# Patient Record
Sex: FEMALE | Race: WHITE | NOT HISPANIC OR LATINO
[De-identification: names, ages, dates, MRNs, and addresses within clinical notes are randomized per-mention and may not be internally consistent; named-entity substitution may affect disease eponyms.]

---

## 2020-10-04 PROBLEM — Z00.00 ENCOUNTER FOR PREVENTIVE HEALTH EXAMINATION: Status: ACTIVE | Noted: 2020-10-04

## 2020-10-05 ENCOUNTER — APPOINTMENT (OUTPATIENT)
Dept: DISASTER EMERGENCY | Facility: CLINIC | Age: 70
End: 2020-10-05

## 2020-10-06 LAB — SARS-COV-2 N GENE NPH QL NAA+PROBE: NOT DETECTED

## 2020-11-09 ENCOUNTER — APPOINTMENT (OUTPATIENT)
Dept: DISASTER EMERGENCY | Facility: CLINIC | Age: 70
End: 2020-11-09

## 2020-11-10 LAB — SARS-COV-2 N GENE NPH QL NAA+PROBE: NOT DETECTED

## 2020-11-11 ENCOUNTER — TRANSCRIPTION ENCOUNTER (OUTPATIENT)
Age: 70
End: 2020-11-11

## 2020-12-07 ENCOUNTER — APPOINTMENT (OUTPATIENT)
Dept: DISASTER EMERGENCY | Facility: CLINIC | Age: 70
End: 2020-12-07

## 2020-12-08 LAB — SARS-COV-2 N GENE NPH QL NAA+PROBE: NOT DETECTED

## 2021-02-08 DIAGNOSIS — U07.1 COVID-19: ICD-10-CM

## 2021-02-08 DIAGNOSIS — R12 HEARTBURN: ICD-10-CM

## 2021-02-08 DIAGNOSIS — Z78.9 OTHER SPECIFIED HEALTH STATUS: ICD-10-CM

## 2021-02-08 DIAGNOSIS — Z83.3 FAMILY HISTORY OF DIABETES MELLITUS: ICD-10-CM

## 2021-02-08 DIAGNOSIS — J98.4 OTHER DISORDERS OF LUNG: ICD-10-CM

## 2021-02-08 DIAGNOSIS — Z82.49 FAMILY HISTORY OF ISCHEMIC HEART DISEASE AND OTHER DISEASES OF THE CIRCULATORY SYSTEM: ICD-10-CM

## 2021-02-08 DIAGNOSIS — Z87.891 PERSONAL HISTORY OF NICOTINE DEPENDENCE: ICD-10-CM

## 2021-02-08 RX ORDER — BIOTIN 1000 MCG
1000 TABLET,CHEWABLE ORAL DAILY
Refills: 0 | Status: ACTIVE | COMMUNITY

## 2021-02-08 RX ORDER — GABAPENTIN 300 MG/1
300 CAPSULE ORAL AT BEDTIME
Refills: 0 | Status: ACTIVE | COMMUNITY

## 2021-02-08 RX ORDER — DULAGLUTIDE 3 MG/.5ML
3 INJECTION, SOLUTION SUBCUTANEOUS
Refills: 0 | Status: ACTIVE | COMMUNITY

## 2021-02-08 RX ORDER — LOSARTAN POTASSIUM 100 MG/1
100 TABLET, FILM COATED ORAL DAILY
Refills: 0 | Status: ACTIVE | COMMUNITY

## 2021-02-08 RX ORDER — ELECTROLYTES/DEXTROSE
180 SOLUTION, ORAL ORAL DAILY
Refills: 0 | Status: ACTIVE | COMMUNITY

## 2021-02-08 RX ORDER — ZINC SULFATE 50(220)MG
50 CAPSULE ORAL DAILY
Refills: 0 | Status: ACTIVE | COMMUNITY

## 2021-02-08 RX ORDER — B-COMPLEX WITH VITAMIN C
CAPSULE ORAL
Refills: 0 | Status: ACTIVE | COMMUNITY

## 2021-02-08 RX ORDER — INSULIN ASPART 100 [IU]/ML
100 INJECTION, SOLUTION INTRAVENOUS; SUBCUTANEOUS
Refills: 0 | Status: ACTIVE | COMMUNITY

## 2021-02-08 RX ORDER — ICOSAPENT ETHYL 1000 MG/1
1 CAPSULE ORAL TWICE DAILY
Refills: 0 | Status: ACTIVE | COMMUNITY

## 2021-02-08 RX ORDER — SODIUM CHLORIDE 5 %
450 OINTMENT (GRAM) OPHTHALMIC (EYE) DAILY
Refills: 0 | Status: ACTIVE | COMMUNITY

## 2021-02-08 RX ORDER — INSULIN DEGLUDEC INJECTION 200 U/ML
200 INJECTION, SOLUTION SUBCUTANEOUS AT BEDTIME
Refills: 0 | Status: ACTIVE | COMMUNITY

## 2021-02-08 RX ORDER — MULTIVIT-MIN/FOLIC/VIT K/LYCOP 400-300MCG
1000 TABLET ORAL DAILY
Refills: 0 | Status: ACTIVE | COMMUNITY

## 2021-02-08 RX ORDER — CINNAMON BARK 500 MG
CAPSULE ORAL
Refills: 0 | Status: ACTIVE | COMMUNITY

## 2021-02-08 RX ORDER — METFORMIN HYDROCHLORIDE 500 MG/1
500 TABLET, COATED ORAL TWICE DAILY
Refills: 0 | Status: ACTIVE | COMMUNITY

## 2021-02-08 RX ORDER — PANTOPRAZOLE 40 MG/1
40 TABLET, DELAYED RELEASE ORAL
Refills: 0 | Status: ACTIVE | COMMUNITY

## 2021-02-08 RX ORDER — LACTOBACILLUS RHAMNOSUS GG 10B CELL
CAPSULE ORAL DAILY
Refills: 0 | Status: ACTIVE | COMMUNITY

## 2021-02-08 RX ORDER — COLD-HOT PACK
125 MCG EACH MISCELLANEOUS DAILY
Refills: 0 | Status: ACTIVE | COMMUNITY

## 2021-02-08 RX ORDER — ROSUVASTATIN CALCIUM 5 MG/1
5 TABLET, FILM COATED ORAL DAILY
Refills: 0 | Status: ACTIVE | COMMUNITY

## 2021-02-09 ENCOUNTER — APPOINTMENT (OUTPATIENT)
Dept: CARDIOLOGY | Facility: CLINIC | Age: 71
End: 2021-02-09
Payer: MEDICARE

## 2021-02-09 ENCOUNTER — NON-APPOINTMENT (OUTPATIENT)
Age: 71
End: 2021-02-09

## 2021-02-09 VITALS
SYSTOLIC BLOOD PRESSURE: 132 MMHG | HEIGHT: 65 IN | BODY MASS INDEX: 31.82 KG/M2 | TEMPERATURE: 97.5 F | OXYGEN SATURATION: 95 % | DIASTOLIC BLOOD PRESSURE: 78 MMHG | WEIGHT: 191 LBS | HEART RATE: 114 BPM

## 2021-02-09 DIAGNOSIS — E11.9 TYPE 2 DIABETES MELLITUS W/OUT COMPLICATIONS: ICD-10-CM

## 2021-02-09 DIAGNOSIS — R06.02 SHORTNESS OF BREATH: ICD-10-CM

## 2021-02-09 PROCEDURE — 93000 ELECTROCARDIOGRAM COMPLETE: CPT

## 2021-02-09 PROCEDURE — 99204 OFFICE O/P NEW MOD 45 MIN: CPT

## 2021-02-09 NOTE — REASON FOR VISIT
[FreeTextEntry1] : Ms. Bergman is a a 70 yr old F with significant family history of cardiac disease and diabetes and personal history of HTN, HLD, DM, former smoking presents in to establish care in the [facility] program.\par She was diagnosed with COVID 12/28/2020. Was discharged [facility] the [facility] on 1/14/2021. Received BAM 12/29/2020 and went home. The next week she couldn't breathe and was admitted [facility]/ She was discharged on 1/14. She was discharged home with Oxygen and Xarelto 10 mg QD x 1 month. \par Echocardiogram performed at [facility] - is a disc - images and report reviewed. All normal\par \par Labs -, HDL 27, , LDL 36

## 2021-02-09 NOTE — PHYSICAL EXAM
[Normal Appearance] : normal appearance [General Appearance - Well Developed] : well developed [Well Groomed] : well groomed [General Appearance - Well Nourished] : well nourished [No Deformities] : no deformities [General Appearance - In No Acute Distress] : no acute distress [Normal Conjunctiva] : the conjunctiva exhibited no abnormalities [Eyelids - No Xanthelasma] : the eyelids demonstrated no xanthelasmas [Normal Oral Mucosa] : normal oral mucosa [No Oral Pallor] : no oral pallor [No Oral Cyanosis] : no oral cyanosis [Normal Jugular Venous A Waves Present] : normal jugular venous A waves present [Normal Jugular Venous V Waves Present] : normal jugular venous V waves present [No Jugular Venous Sanchez A Waves] : no jugular venous sanchez A waves [Normal Rate] : normal [Normal] : normal [Rhythm Regular] : regular [Normal S1] : normal S1 [Normal S2] : normal S2 [Respiration, Rhythm And Depth] : normal respiratory rhythm and effort [Exaggerated Use Of Accessory Muscles For Inspiration] : no accessory muscle use [Auscultation Breath Sounds / Voice Sounds] : lungs were clear to auscultation bilaterally [Abdomen Soft] : soft [Abdomen Tenderness] : non-tender [Abdomen Mass (___ Cm)] : no abdominal mass palpated [Abnormal Walk] : normal gait [Gait - Sufficient For Exercise Testing] : the gait was sufficient for exercise testing [Cyanosis, Localized] : no localized cyanosis [Nail Clubbing] : no clubbing of the fingernails [Petechial Hemorrhages (___cm)] : no petechial hemorrhages [] : no rash [Skin Color & Pigmentation] : normal skin color and pigmentation [No Venous Stasis] : no venous stasis [Skin Lesions] : no skin lesions [No Skin Ulcers] : no skin ulcer [No Xanthoma] : no  xanthoma was observed [Oriented To Time, Place, And Person] : oriented to person, place, and time [Affect] : the affect was normal [Mood] : the mood was normal [No Anxiety] : not feeling anxious

## 2021-02-09 NOTE — DISCUSSION/SUMMARY
[FreeTextEntry1] : Ms. Bergman is a a 70 yr old F with significant family history of cardiac disease and diabetes and personal history of HTN, HLD, DM, former smoking presents in to establish care in the [facility] program.\par She was diagnosed with COVID 12/28/2020. Was discharged [facility] the [facility] on 1/14/2021. Received BAM 12/29/2020 and went home. The next week she couldn't breathe and was admitted [facility]/ She was discharged on 1/14. She was discharged home with Oxygen and Xarelto 10 mg QD x 1 month. \par Echocardiogram performed at [facility] - is a disc - unable to open ( will also obtain official report )\par \par \par 1) HTN- BP stable \par Continue Losartan 100 mg QD \par \par 2) HLD - Continue Statin as prescribed \par Labs -, HDL 27, , LDL 36 \par Advised Mediterranean style diet and repeat lipid panel in 3 months \par  \par 3) DM - Continue oral hypoglycemic agents as prescribed by endocrine\par \par 4) SOB - Currently on home O2 prn \par pulm following \par \par 5) Tachycardia - Likely secondary to COVID sequlae\par if persists will consider BioTel Patch \par  Advised to call if symptoms worsen \par \par F/u 3 months \par and regular f/u with Cardiologist for routine lipid monitoring and management.\par \par

## 2021-02-09 NOTE — PHYSICAL EXAM
[General Appearance - Well Developed] : well developed [Normal Appearance] : normal appearance [Well Groomed] : well groomed [General Appearance - Well Nourished] : well nourished [No Deformities] : no deformities [General Appearance - In No Acute Distress] : no acute distress [Normal Conjunctiva] : the conjunctiva exhibited no abnormalities [Eyelids - No Xanthelasma] : the eyelids demonstrated no xanthelasmas [Normal Oral Mucosa] : normal oral mucosa [No Oral Pallor] : no oral pallor [No Oral Cyanosis] : no oral cyanosis [Normal Jugular Venous A Waves Present] : normal jugular venous A waves present [Normal Jugular Venous V Waves Present] : normal jugular venous V waves present [No Jugular Venous Sanchez A Waves] : no jugular venous sanchez A waves [Normal Rate] : normal [Normal] : normal [Rhythm Regular] : regular [Normal S1] : normal S1 [Normal S2] : normal S2 [Respiration, Rhythm And Depth] : normal respiratory rhythm and effort [Exaggerated Use Of Accessory Muscles For Inspiration] : no accessory muscle use [Auscultation Breath Sounds / Voice Sounds] : lungs were clear to auscultation bilaterally [Abdomen Soft] : soft [Abdomen Tenderness] : non-tender [Abdomen Mass (___ Cm)] : no abdominal mass palpated [Abnormal Walk] : normal gait [Gait - Sufficient For Exercise Testing] : the gait was sufficient for exercise testing [Cyanosis, Localized] : no localized cyanosis [Nail Clubbing] : no clubbing of the fingernails [Petechial Hemorrhages (___cm)] : no petechial hemorrhages [] : no rash [Skin Color & Pigmentation] : normal skin color and pigmentation [No Venous Stasis] : no venous stasis [Skin Lesions] : no skin lesions [No Xanthoma] : no  xanthoma was observed [No Skin Ulcers] : no skin ulcer [Oriented To Time, Place, And Person] : oriented to person, place, and time [Affect] : the affect was normal [Mood] : the mood was normal [No Anxiety] : not feeling anxious

## 2021-02-09 NOTE — DISCUSSION/SUMMARY
[FreeTextEntry1] : Ms. Bergman is a a 70 yr old F with significant family history of cardiac disease and diabetes and personal history of HTN, HLD, DM, former smoking presents in to establish care in the [facility] program.\par She was diagnosed with COVID 12/28/2020. Was discharged from the [facility] on 1/14/2021. Received BAM 12/29/2020 and went home. The next week she couldn't breathe and was admitted [facility]/ She was discharged on 1/14. She was discharged home with Oxygen and Xarelto 10 mg QD x 1 month. \par Echocardiogram performed at [facility] - is a disc - unable to open ( will also obtain official report )\par \par \par 1) HTN- BP stable \par Continue Losartan 100 mg QD \par \par 2) HLD - Continue Statin as prescribed \par Labs -, HDL 27, , LDL 36 \par Advised Mediterranean style diet and repeat lipid panel in 3 months \par  \par 3) DM - Continue oral hypoglycemic agents as prescribed by endocrine\par \par 4) SOB - Currently on home O2 prn \par pulm following \par \par 5) Tachycardia - Likely secondary to COVID sequlae\par if persists will consider BioTel Patch \par  Advised to call if symptoms worsen \par \par F/u 3 months \par and regular f/u with Cardiologist for routine lipid monitoring and management.\par \par

## 2021-02-09 NOTE — REVIEW OF SYSTEMS
Diagnoses and all orders for this visit:    Essential hypertension  -     COMP METABOLIC PANEL (14); Future  -     LIPID PANEL; Future    Lumbar facet arthropathy  -     TSH W REFLEX TO FREE T4; Future  -     CBC, PLATELET; NO DIFFERENTIAL;  Future    Morbi [Negative] : Endocrine

## 2021-02-09 NOTE — REASON FOR VISIT
[FreeTextEntry1] : Ms. Bergman is a a 70 yr old F with significant family history of cardiac disease and diabetes and personal history of HTN, HLD, DM, former smoking presents in to establish care in the [facility] program.\par She was diagnosed with COVID 12/28/2020. Was discharged from the [facility] on 1/14/2021. Received BAM 12/29/2020 and went home. The next week she couldn't breathe and was admitted [facility]/ She was discharged on 1/14. She was discharged home with Oxygen and Xarelto 10 mg QD x 1 month. \par Echocardiogram performed at [facility] - is a disc - images and report reviewed. All normal\par \par Labs -, HDL 27, , LDL 36

## 2021-05-10 ENCOUNTER — NON-APPOINTMENT (OUTPATIENT)
Age: 71
End: 2021-05-10

## 2021-05-10 ENCOUNTER — APPOINTMENT (OUTPATIENT)
Dept: CARDIOLOGY | Facility: CLINIC | Age: 71
End: 2021-05-10
Payer: MEDICARE

## 2021-05-10 VITALS
HEART RATE: 86 BPM | SYSTOLIC BLOOD PRESSURE: 150 MMHG | BODY MASS INDEX: 31.82 KG/M2 | OXYGEN SATURATION: 95 % | TEMPERATURE: 97 F | WEIGHT: 191 LBS | DIASTOLIC BLOOD PRESSURE: 76 MMHG | HEIGHT: 65 IN

## 2021-05-10 DIAGNOSIS — R00.2 PALPITATIONS: ICD-10-CM

## 2021-05-10 DIAGNOSIS — I10 ESSENTIAL (PRIMARY) HYPERTENSION: ICD-10-CM

## 2021-05-10 DIAGNOSIS — E78.5 HYPERLIPIDEMIA, UNSPECIFIED: ICD-10-CM

## 2021-05-10 PROCEDURE — 93000 ELECTROCARDIOGRAM COMPLETE: CPT

## 2021-05-10 PROCEDURE — 99213 OFFICE O/P EST LOW 20 MIN: CPT

## 2021-05-10 NOTE — HISTORY OF PRESENT ILLNESS
[FreeTextEntry1] : Here for followup\par Doing much better\par No longer on O2\par HR has come down dramatically\par #Palpitaitons- resolved- due to COVID, continue to monitor\par #HTN- BP is stable on Losartan, continue to monitor\par #HLD- Continue Crestor

## 2021-05-10 NOTE — DISCUSSION/SUMMARY
[FreeTextEntry1] : 70 year old woman with HTN HLD and COVID in Jan here for followup\par \par #Palpitaitons- resolved- due to COVID, continue to monitor\par #HTN- BP is stable on Losartan, continue to monitor\par #HLD- Continue Crestor\par

## 2021-10-08 DIAGNOSIS — Z01.818 ENCOUNTER FOR OTHER PREPROCEDURAL EXAMINATION: ICD-10-CM

## 2021-10-15 ENCOUNTER — APPOINTMENT (OUTPATIENT)
Dept: DISASTER EMERGENCY | Facility: CLINIC | Age: 71
End: 2021-10-15

## 2021-10-16 LAB — SARS-COV-2 N GENE NPH QL NAA+PROBE: NOT DETECTED

## 2021-10-22 ENCOUNTER — LABORATORY RESULT (OUTPATIENT)
Age: 71
End: 2021-10-22

## 2021-10-22 ENCOUNTER — APPOINTMENT (OUTPATIENT)
Dept: DISASTER EMERGENCY | Facility: CLINIC | Age: 71
End: 2021-10-22

## 2021-10-22 DIAGNOSIS — Z01.818 ENCOUNTER FOR OTHER PREPROCEDURAL EXAMINATION: ICD-10-CM

## 2021-11-11 ENCOUNTER — APPOINTMENT (OUTPATIENT)
Dept: CARDIOLOGY | Facility: CLINIC | Age: 71
End: 2021-11-11

## 2022-06-02 ENCOUNTER — APPOINTMENT (OUTPATIENT)
Dept: ORTHOPEDIC SURGERY | Facility: CLINIC | Age: 72
End: 2022-06-02
Payer: MEDICARE

## 2022-06-02 VITALS — WEIGHT: 191 LBS | HEIGHT: 65 IN | BODY MASS INDEX: 31.82 KG/M2

## 2022-06-02 PROCEDURE — 73562 X-RAY EXAM OF KNEE 3: CPT | Mod: LT

## 2022-06-02 PROCEDURE — 73502 X-RAY EXAM HIP UNI 2-3 VIEWS: CPT | Mod: LT

## 2022-06-02 PROCEDURE — 99204 OFFICE O/P NEW MOD 45 MIN: CPT | Mod: 25

## 2022-06-02 PROCEDURE — 20610 DRAIN/INJ JOINT/BURSA W/O US: CPT

## 2022-06-02 PROCEDURE — 99214 OFFICE O/P EST MOD 30 MIN: CPT | Mod: 25

## 2022-06-02 NOTE — HISTORY OF PRESENT ILLNESS
[Constant] : constant [Sleep] : sleep [Sitting] : sitting [Standing] : standing [de-identified] : Had LESIs last year for left lumbar radic, now having pain in left hip and left knee, has difficulty walking on certain days uses a cane.  [] : no [FreeTextEntry1] : left hip/knee  [FreeTextEntry5] : patient has been feeling pain in her left hip and knee. She feels a constant pain in the hip that radiates into the leg, down into the foot. Her ankle swells at the end of the day

## 2022-06-02 NOTE — PROCEDURE
[Large Joint Injection] : Large joint injection [Left] : of the left [Knee] : knee [Pain] : pain [Alcohol] : alcohol [Betadine] : betadine [Ethyl Chloride sprayed topically] : ethyl chloride sprayed topically [Orthovisc] : Orthovisc [#1] : series #1

## 2022-06-02 NOTE — PHYSICAL EXAM
[5___] : hamstring 5[unfilled]/5 [Equivocal] : equivocal Bessy [There are no fractures, subluxations or dislocations. No significant abnormalities are seen] : There are no fractures, subluxations or dislocations. No significant abnormalities are seen [Left] : left knee [AP] : anteroposterior [Lateral] : lateral [advanced tricompartmental OA with medial compartment narrowing and varus alignment] : advanced tricompartmental OA with medial compartment narrowing and varus alignment [] : no effusion [TWNoteComboBox7] : flexion 125 degrees [de-identified] : extension 0 degrees

## 2022-06-02 NOTE — DISCUSSION/SUMMARY
[de-identified] : The hip pain is radicular, will go back to Pain Management for that. Will start Orthovisc series for left knee

## 2022-06-09 ENCOUNTER — APPOINTMENT (OUTPATIENT)
Dept: ORTHOPEDIC SURGERY | Facility: CLINIC | Age: 72
End: 2022-06-09
Payer: MEDICARE

## 2022-06-09 PROCEDURE — 20610 DRAIN/INJ JOINT/BURSA W/O US: CPT | Mod: LT

## 2022-06-09 PROCEDURE — 99213 OFFICE O/P EST LOW 20 MIN: CPT | Mod: 25

## 2022-06-09 NOTE — PROCEDURE
[#2] : series #2 [Large Joint Injection] : Large joint injection [Left] : of the left [Knee] : knee [Pain] : pain [Alcohol] : alcohol [Betadine] : betadine [Ethyl Chloride sprayed topically] : ethyl chloride sprayed topically [Orthovisc] : Orthovisc [#1] : series #1

## 2022-06-09 NOTE — DISCUSSION/SUMMARY
[de-identified] : The hip pain is radicular, will go back to Pain Management for that. Will start Orthovisc series for left knee

## 2022-06-09 NOTE — DISCUSSION/SUMMARY
[de-identified] : The hip pain is radicular, will go back to Pain Management for that. Will start Orthovisc series for left knee

## 2022-06-09 NOTE — PHYSICAL EXAM
[There are no fractures, subluxations or dislocations. No significant abnormalities are seen] : There are no fractures, subluxations or dislocations. No significant abnormalities are seen [5___] : hamstring 5[unfilled]/5 [Equivocal] : equivocal Bessy [Left] : left knee [AP] : anteroposterior [Lateral] : lateral [advanced tricompartmental OA with medial compartment narrowing and varus alignment] : advanced tricompartmental OA with medial compartment narrowing and varus alignment [] : no effusion [TWNoteComboBox7] : flexion 125 degrees [de-identified] : extension 0 degrees

## 2022-06-09 NOTE — PHYSICAL EXAM
[There are no fractures, subluxations or dislocations. No significant abnormalities are seen] : There are no fractures, subluxations or dislocations. No significant abnormalities are seen [5___] : hamstring 5[unfilled]/5 [Equivocal] : equivocal Bessy [Left] : left knee [AP] : anteroposterior [Lateral] : lateral [advanced tricompartmental OA with medial compartment narrowing and varus alignment] : advanced tricompartmental OA with medial compartment narrowing and varus alignment [] : no effusion [TWNoteComboBox7] : flexion 125 degrees [de-identified] : extension 0 degrees

## 2022-06-09 NOTE — HISTORY OF PRESENT ILLNESS
[Constant] : constant [Sleep] : sleep [Sitting] : sitting [Standing] : standing [de-identified] : 6-9-22- for continued orthovisc to the left knee [] : no [FreeTextEntry1] : left hip/knee  [FreeTextEntry5] : patient has been feeling pain in her left hip and knee. She feels a constant pain in the hip that radiates into the leg, down into the foot. Her ankle swells at the end of the day

## 2022-06-09 NOTE — HISTORY OF PRESENT ILLNESS
[Constant] : constant [Sleep] : sleep [Sitting] : sitting [Standing] : standing [de-identified] : 6-9-22- for continued orthovisc to the left knee [] : no [FreeTextEntry1] : left hip/knee  [FreeTextEntry5] : patient has been feeling pain in her left hip and knee. She feels a constant pain in the hip that radiates into the leg, down into the foot. Her ankle swells at the end of the day

## 2022-06-16 ENCOUNTER — APPOINTMENT (OUTPATIENT)
Dept: ORTHOPEDIC SURGERY | Facility: CLINIC | Age: 72
End: 2022-06-16
Payer: MEDICARE

## 2022-06-16 PROCEDURE — 20610 DRAIN/INJ JOINT/BURSA W/O US: CPT

## 2022-06-16 NOTE — DISCUSSION/SUMMARY
[de-identified] : The hip pain is radicular, will go back to Pain Management for that. Will start Orthovisc series for left knee

## 2022-06-16 NOTE — PROCEDURE
[Large Joint Injection] : Large joint injection [Left] : of the left [Knee] : knee [Pain] : pain [Alcohol] : alcohol [Betadine] : betadine [Ethyl Chloride sprayed topically] : ethyl chloride sprayed topically [Orthovisc] : Orthovisc [#3] : series #3

## 2022-06-16 NOTE — PHYSICAL EXAM
[There are no fractures, subluxations or dislocations. No significant abnormalities are seen] : There are no fractures, subluxations or dislocations. No significant abnormalities are seen [5___] : hamstring 5[unfilled]/5 [Equivocal] : equivocal Bessy [Left] : left knee [AP] : anteroposterior [Lateral] : lateral [advanced tricompartmental OA with medial compartment narrowing and varus alignment] : advanced tricompartmental OA with medial compartment narrowing and varus alignment [] : no effusion [TWNoteComboBox7] : flexion 125 degrees [de-identified] : extension 0 degrees

## 2022-06-16 NOTE — HISTORY OF PRESENT ILLNESS
[Constant] : constant [Sleep] : sleep [Sitting] : sitting [Standing] : standing [de-identified] : 6-16-22- for continued orthovisc to the left knee [] : no [FreeTextEntry1] : left hip/knee  [FreeTextEntry5] : patient has been feeling pain in her left hip and knee. She feels a constant pain in the hip that radiates into the leg, down into the foot. Her ankle swells at the end of the day

## 2022-06-23 ENCOUNTER — APPOINTMENT (OUTPATIENT)
Dept: ORTHOPEDIC SURGERY | Facility: CLINIC | Age: 72
End: 2022-06-23
Payer: MEDICARE

## 2022-06-23 DIAGNOSIS — M17.12 UNILATERAL PRIMARY OSTEOARTHRITIS, LEFT KNEE: ICD-10-CM

## 2022-06-23 PROCEDURE — 20610 DRAIN/INJ JOINT/BURSA W/O US: CPT

## 2022-06-23 NOTE — HISTORY OF PRESENT ILLNESS
[Constant] : constant [Sleep] : sleep [Sitting] : sitting [Standing] : standing [de-identified] : 6-23-22- for continued orthovisc to the left knee (#4) [] : no [FreeTextEntry1] : left hip/knee  [FreeTextEntry5] : patient has been feeling pain in her left hip and knee. She feels a constant pain in the hip that radiates into the leg, down into the foot. Her ankle swells at the end of the day

## 2022-06-23 NOTE — PROCEDURE
[Large Joint Injection] : Large joint injection [Left] : of the left [Knee] : knee [Pain] : pain [Alcohol] : alcohol [Betadine] : betadine [Ethyl Chloride sprayed topically] : ethyl chloride sprayed topically [Orthovisc] : Orthovisc [Call if redness, pain or fever occur] : call if redness, pain or fever occur [Apply ice for 15min out of every hour for the next 12-24 hours as tolerated] : apply ice for 15 minutes out of every hour for the next 12-24 hours as tolerated [Patient was advised to rest the joint(s) for ____ days] : patient was advised to rest the joint(s) for [unfilled] days [Previous OTC use and PT nontherapeutic] : patient has tried OTC's including aspirin, Ibuprofen, Aleve, etc or prescription NSAIDS, and/or exercises at home and/or physical therapy without satisfactory response [Patient had decreased mobility in the joint] : patient had decreased mobility in the joint [Risks, benefits, alternatives discussed / Verbal consent obtained] : the risks benefits, and alternatives have been discussed, and verbal consent was obtained [#4] : series #4

## 2022-06-23 NOTE — PHYSICAL EXAM
[There are no fractures, subluxations or dislocations. No significant abnormalities are seen] : There are no fractures, subluxations or dislocations. No significant abnormalities are seen [5___] : hamstring 5[unfilled]/5 [Equivocal] : equivocal Bessy [Left] : left knee [AP] : anteroposterior [Lateral] : lateral [advanced tricompartmental OA with medial compartment narrowing and varus alignment] : advanced tricompartmental OA with medial compartment narrowing and varus alignment [] : no effusion [TWNoteComboBox7] : flexion 125 degrees [de-identified] : extension 0 degrees

## 2022-08-11 ENCOUNTER — APPOINTMENT (OUTPATIENT)
Dept: ORTHOPEDIC SURGERY | Facility: CLINIC | Age: 72
End: 2022-08-11

## 2023-02-17 ENCOUNTER — APPOINTMENT (OUTPATIENT)
Dept: PAIN MANAGEMENT | Facility: CLINIC | Age: 73
End: 2023-02-17
Payer: MEDICARE

## 2023-02-17 VITALS — HEIGHT: 65 IN | WEIGHT: 220 LBS | BODY MASS INDEX: 36.65 KG/M2

## 2023-02-17 PROCEDURE — J3490M: CUSTOM | Mod: NC

## 2023-02-17 PROCEDURE — 20552 NJX 1/MLT TRIGGER POINT 1/2: CPT

## 2023-02-17 PROCEDURE — 99214 OFFICE O/P EST MOD 30 MIN: CPT | Mod: 25

## 2023-02-17 NOTE — HISTORY OF PRESENT ILLNESS
[Lower back] : lower back [8] : 8 [7] : 7 [Dull/Aching] : dull/aching [Constant] : constant [Household chores] : household chores [Leisure] : leisure [Sleep] : sleep [Meds] : meds [Standing] : standing [Walking] : walking [Lying in bed] : lying in bed [] : no [FreeTextEntry7] : LEGS

## 2023-02-17 NOTE — PHYSICAL EXAM
[Orientated] : orientated [Able to Communicate] : able to communicate [Well Developed] : well developed [Well Nourished] : well nourished [] : sensory exam non-focal throughout both lower extremities

## 2023-02-17 NOTE — PROCEDURE
[Trigger point 1-2 muscle groups] : trigger point 1-2 muscle groups [Bilateral] : bilaterally of the [Lumbar paraspinal muscle] : lumbar paraspinal muscle [Pain] : pain [Inflammation] : inflammation [Alcohol] : alcohol [Ethyl Chloride sprayed topically] : ethyl chloride sprayed topically [Sterile technique used] : sterile technique used [___ cc    1%] : Lidocaine ~Vcc of 1%  [___ cc    0.25%] : Bupivacaine (Marcaine) ~Vcc of 0.25%  [___ cc    10mg] : Triamcinolone (Kenalog) ~Vcc of 10 mg  [] : Patient tolerated procedure well [Call if redness, pain or fever occur] : call if redness, pain or fever occur [Risks, benefits, alternatives discussed / Verbal consent obtained] : the risks benefits, and alternatives have been discussed, and verbal consent was obtained

## 2023-02-17 NOTE — ASSESSMENT
[FreeTextEntry1] : prior series of injections, including L hip with >90% rellief of pain improved rom and adls, 2021\par \par \par C/O PAIN ACROSS LOWER BACK WILL RADIATE INTO LLE WHEN STANDING AND RLE WHEN SITTING\par

## 2023-02-19 ENCOUNTER — FORM ENCOUNTER (OUTPATIENT)
Age: 73
End: 2023-02-19

## 2023-02-20 ENCOUNTER — APPOINTMENT (OUTPATIENT)
Dept: MRI IMAGING | Facility: CLINIC | Age: 73
End: 2023-02-20
Payer: MEDICARE

## 2023-02-20 PROCEDURE — 72148 MRI LUMBAR SPINE W/O DYE: CPT | Mod: MH

## 2023-03-15 ENCOUNTER — APPOINTMENT (OUTPATIENT)
Dept: PAIN MANAGEMENT | Facility: CLINIC | Age: 73
End: 2023-03-15
Payer: MEDICARE

## 2023-03-15 VITALS — WEIGHT: 223 LBS | HEIGHT: 65 IN | BODY MASS INDEX: 37.15 KG/M2

## 2023-03-15 DIAGNOSIS — M25.552 PAIN IN LEFT HIP: ICD-10-CM

## 2023-03-15 PROCEDURE — 99214 OFFICE O/P EST MOD 30 MIN: CPT

## 2023-03-15 NOTE — PHYSICAL EXAM
[Normal Coordination] : normal coordination [Normal DTR UE/LE] : normal DTR UE/LE  [Normal Sensation] : normal sensation [Normal Mood and Affect] : normal mood and affect [Able to Communicate] : able to communicate [Well Developed] : well developed [Well Nourished] : well nourished [No Respiratory Distress] : no respiratory distress [] : mildly antalgic

## 2023-03-15 NOTE — DISCUSSION/SUMMARY
[Surgical risks reviewed] : Surgical risks reviewed [de-identified] : After discussing various treatment options with the patient including but not limited to oral medications, physical therapy, exercise,\par modalities as well as interventional spinal injections, we have decided with the following plan\par \par I personally reviewed the MRI/CT scan images and agree with the radiologist's report. The\par radiological findings were discussed with the patient.\par \par \par The risks, benefits, contents and alternatives to injection were explained in full to the patient. Risks outlined include but are not\par limited to infection,sepsis, bleeding, post-dural puncture headache, nerve damage, temporary increase in pain, syncopal episode,\par failure to resolve symptoms, allergic reaction, symptom recurrence, and elevation of blood sugar in diabetics. Cortisone may cause\par immunosuppression. Patient understands the risks. All questions were answered. After discussion of options, patient requested\par an injection. Information regarding the injection was given to the patient. Which medications to stop prior to the injection was\par explained to the patient as well.\par \par Follow up in 1-2 weeks post injection for re-evaluation.\par \par  Conservative Care\par Continue Home exercises, stretching, activity modification, physical therapy, and conservative care.\par \par Proceed with L L4/5 L5/S1 TFESI

## 2023-03-15 NOTE — ASSESSMENT
[FreeTextEntry1] : s/p Hip injection 2021\par \par here today to review MRI lumbar spine. Pain is in back with radiation to LLE, L hip and buttock.

## 2023-03-22 ENCOUNTER — APPOINTMENT (OUTPATIENT)
Dept: PAIN MANAGEMENT | Facility: CLINIC | Age: 73
End: 2023-03-22
Payer: MEDICARE

## 2023-03-22 PROCEDURE — 64483 NJX AA&/STRD TFRM EPI L/S 1: CPT | Mod: LT

## 2023-03-22 PROCEDURE — 64484 NJX AA&/STRD TFRM EPI L/S EA: CPT | Mod: LT

## 2023-04-19 ENCOUNTER — APPOINTMENT (OUTPATIENT)
Dept: PAIN MANAGEMENT | Facility: CLINIC | Age: 73
End: 2023-04-19

## 2024-05-15 ENCOUNTER — APPOINTMENT (OUTPATIENT)
Dept: ORTHOPEDIC SURGERY | Facility: CLINIC | Age: 74
End: 2024-05-15
Payer: MEDICARE

## 2024-05-15 VITALS — BODY MASS INDEX: 37.15 KG/M2 | WEIGHT: 223 LBS | HEIGHT: 65 IN

## 2024-05-15 DIAGNOSIS — E78.00 PURE HYPERCHOLESTEROLEMIA, UNSPECIFIED: ICD-10-CM

## 2024-05-15 DIAGNOSIS — I10 ESSENTIAL (PRIMARY) HYPERTENSION: ICD-10-CM

## 2024-05-15 PROCEDURE — 72110 X-RAY EXAM L-2 SPINE 4/>VWS: CPT

## 2024-05-15 PROCEDURE — 99214 OFFICE O/P EST MOD 30 MIN: CPT

## 2024-05-15 RX ORDER — CYCLOBENZAPRINE HYDROCHLORIDE 5 MG/1
5 TABLET, FILM COATED ORAL 3 TIMES DAILY
Qty: 30 | Refills: 2 | Status: ACTIVE | COMMUNITY
Start: 2024-05-15 | End: 1900-01-01

## 2024-05-15 NOTE — HISTORY OF PRESENT ILLNESS
[Lower back] : lower back [Left Leg] : left leg [de-identified] : 05/15/2024: 73 yr old female presents today with c/o left sided low back pain and left radicular pain. Patient reports symptoms worsen with ambulation, reports claudication symptoms- below the knee on LLE only. Has received L L4/5 L5/S1 TFESI with Dr. Lopes with no improvement in 2023 and again in 2024.   MRI of lumbar spine completed at [facility] in 2023, full report in chart.  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

## 2024-05-15 NOTE — ASSESSMENT
[FreeTextEntry1] : 73 F with LBP LLE radic and neurogenic claudication MRI is 1 year old MRI L spine for surgical planning  Likely L4 and L5 laminectomy with discectomy FU after MRI  Risks and benefits of surgery including but not limited to bleeding, infection, damage to surrounding structures, hardware complication, pseudoarthrosis, need for revision surgery, durotomy, CSF leak, anesthetic complications, persistent symptoms,. persistent back pain, persistent leg symptoms  were discussed

## 2024-05-30 ENCOUNTER — APPOINTMENT (OUTPATIENT)
Dept: MRI IMAGING | Facility: CLINIC | Age: 74
End: 2024-05-30
Payer: MEDICARE

## 2024-05-30 PROCEDURE — 72148 MRI LUMBAR SPINE W/O DYE: CPT

## 2024-06-05 ENCOUNTER — APPOINTMENT (OUTPATIENT)
Dept: ORTHOPEDIC SURGERY | Facility: CLINIC | Age: 74
End: 2024-06-05
Payer: MEDICARE

## 2024-06-05 DIAGNOSIS — M54.17 RADICULOPATHY, LUMBOSACRAL REGION: ICD-10-CM

## 2024-06-05 DIAGNOSIS — M79.18 MYALGIA, OTHER SITE: ICD-10-CM

## 2024-06-05 PROCEDURE — 99213 OFFICE O/P EST LOW 20 MIN: CPT

## 2024-06-05 NOTE — HISTORY OF PRESENT ILLNESS
[Lower back] : lower back [Left Leg] : left leg [de-identified] : 05/15/2024: 73 yr old female presents today with c/o left sided low back pain and left radicular pain. Patient reports symptoms worsen with ambulation, reports claudication symptoms- below the knee on LLE only. Has received L L4/5 L5/S1 TFESI with Dr. Lopes with no improvement in 2023 and again in 2024.   MRI of lumbar spine completed at [facility] in 2023, full report in chart.  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

## 2024-07-04 ENCOUNTER — APPOINTMENT (OUTPATIENT)
Dept: MRI IMAGING | Facility: CLINIC | Age: 74
End: 2024-07-04
Payer: MEDICARE

## 2024-07-04 ENCOUNTER — APPOINTMENT (OUTPATIENT)
Dept: ORTHOPEDIC SURGERY | Facility: CLINIC | Age: 74
End: 2024-07-04
Payer: MEDICARE

## 2024-07-04 DIAGNOSIS — M75.21 BICIPITAL TENDINITIS, RIGHT SHOULDER: ICD-10-CM

## 2024-07-04 DIAGNOSIS — M75.111 INCOMPLETE ROTATOR CUFF TEAR OR RUPTURE OF RIGHT SHOULDER, NOT SPECIFIED AS TRAUMATIC: ICD-10-CM

## 2024-07-04 DIAGNOSIS — M75.51 BURSITIS OF RIGHT SHOULDER: ICD-10-CM

## 2024-07-04 DIAGNOSIS — M75.01 ADHESIVE CAPSULITIS OF RIGHT SHOULDER: ICD-10-CM

## 2024-07-04 PROCEDURE — 73221 MRI JOINT UPR EXTREM W/O DYE: CPT | Mod: RT,MH

## 2024-07-04 PROCEDURE — 99213 OFFICE O/P EST LOW 20 MIN: CPT

## 2024-07-04 PROCEDURE — 73030 X-RAY EXAM OF SHOULDER: CPT | Mod: RT

## 2024-07-04 RX ORDER — MELOXICAM 15 MG/1
15 TABLET ORAL DAILY
Qty: 15 | Refills: 0 | Status: COMPLETED | COMMUNITY
Start: 2024-07-04 | End: 2024-07-19

## 2024-07-12 ENCOUNTER — APPOINTMENT (OUTPATIENT)
Dept: ORTHOPEDIC SURGERY | Facility: CLINIC | Age: 74
End: 2024-07-12
Payer: MEDICARE

## 2024-07-12 VITALS — WEIGHT: 223 LBS | BODY MASS INDEX: 37.15 KG/M2 | HEIGHT: 65 IN

## 2024-07-12 DIAGNOSIS — M75.101 UNSPECIFIED ROTATOR CUFF TEAR OR RUPTURE OF RIGHT SHOULDER, NOT SPECIFIED AS TRAUMATIC: ICD-10-CM

## 2024-07-12 PROCEDURE — 99213 OFFICE O/P EST LOW 20 MIN: CPT

## 2024-07-17 ENCOUNTER — APPOINTMENT (OUTPATIENT)
Dept: ORTHOPEDIC SURGERY | Facility: CLINIC | Age: 74
End: 2024-07-17
Payer: MEDICARE

## 2024-07-17 VITALS — WEIGHT: 223 LBS | BODY MASS INDEX: 37.15 KG/M2 | HEIGHT: 65 IN

## 2024-07-17 DIAGNOSIS — M54.17 RADICULOPATHY, LUMBOSACRAL REGION: ICD-10-CM

## 2024-07-17 PROCEDURE — 99213 OFFICE O/P EST LOW 20 MIN: CPT

## 2024-08-06 ENCOUNTER — APPOINTMENT (OUTPATIENT)
Dept: PAIN MANAGEMENT | Facility: CLINIC | Age: 74
End: 2024-08-06

## 2024-08-06 PROCEDURE — 99204 OFFICE O/P NEW MOD 45 MIN: CPT

## 2024-08-06 PROCEDURE — 99214 OFFICE O/P EST MOD 30 MIN: CPT

## 2024-08-06 NOTE — DISCUSSION/SUMMARY
[de-identified] : CHRISS BRIZUELA is a 73 year-old woman presenting for a NPV for a history of chronic low back pain.   Prior treatment: 3/22/2023: LEFT L4-L5, L5-S1 TFESI w/ MAC w/ 50% improvement of pain and function  Physical therapy x2 months Cyclobenzaprine Patient has participated and failed at least 6 weeks of conservative therapy including physical therapy and a prescribed home exercise program as well as 6 weeks of activity modifications, including heat, ice, rest, and over the counter medications.  Plan: 1) MRI lumbar spine images reviewed with the patient. 2) Schedule LEFT L4-L5, L5-S1 TFESI (IN/SN) w/ MAC. The procedure was explained to the patient in detail. Reviewed risks, benefits, and alternatives with the patient. Some risks discussed included temporary increase in pain, bleeding, infection, and side effects from steroids. The patient expressed understanding and would like to proceed.  3) Continue cyclobenzaprine 5mg prn; denies AEs 4) Continue gabapentin 600mg BID; denies AEs 5) Continue naproxen 500mg prn 6) RTC post procedure

## 2024-08-06 NOTE — HISTORY OF PRESENT ILLNESS
[Lower back] : lower back [Left Leg] : left leg [10] : 10 [7] : 7 [Burning] : burning [Dull/Aching] : dull/aching [Radiating] : radiating [Throbbing] : throbbing [Tightness] : tightness [Tingling] : tingling [Constant] : constant [Household chores] : household chores [Leisure] : leisure [Sleep] : sleep [Meds] : meds [Injection therapy] : injection therapy [Sitting] : sitting [Standing] : standing [Walking] : walking [Physical therapy] : physical therapy [FreeTextEntry1] : 8/6/2024: CHRISS BRIZUELA is a 73 year-old woman presenting for a NPV (Aster) for a history of chronic low back pain. The patient notes having pain in the low back since 2016. The patient notes that pain has gotten progressively worse over time. Has had treatment with PT, ESIs in the past which have provided some improvement of pain. Is taking cyclobenzaprine. At this point, patient notes an aching pain in the left low back and left gluteal region with radiation to the left posterolateral thigh and anterior leg. Notes burning over this distribution. No focal weakness. Has had to use a walker to assist with ambulation.  The patient states that average pain over the past week was 8/10 in severity. Mood: Patient notes depression/anxiety due to her pain. Sleep: Patient notes significant difficulty with sleep 2/2 pain. Takes cyclobenzaprine prn. [] : no [FreeTextEntry6] : numbness and tingling in L foot  [FreeTextEntry7] : L leg  [FreeTextEntry9] : bending forward  [de-identified] : x-ray and MRI

## 2024-08-06 NOTE — DATA REVIEWED
[MRI] : MRI [Lumbar Spine] : lumbar spine [Report was reviewed and noted in the chart] : The report was reviewed and noted in the chart [I independently reviewed and interpreted images and report] : I independently reviewed and interpreted images and report [FreeTextEntry1] : 5/30/2024 MRI Lumbar Spine O&C L2-L3: disc height loss, disc bulge, moderate facet arthrosis, ligamentous thickening, mild central stenosis L3-L4: mild spinal stenosis, broad disc bulge, facet hypertrophy, ligamentum flavum hypertrophy L4-L5: Modic 2 degenerative changes; broad disc bulge, facet hypertrophy, ligamentum flavum hypertrophy, moderate central stenosis L5-S1: disc bulge, severe facet arthrosis w/ prominent facet effusions, worsening NF stenosis on the LEFT moderate to severe, with visible neural compression

## 2024-08-06 NOTE — HISTORY OF PRESENT ILLNESS
[Lower back] : lower back [Left Leg] : left leg [10] : 10 [7] : 7 [Burning] : burning [Dull/Aching] : dull/aching [Radiating] : radiating [Throbbing] : throbbing [Tightness] : tightness [Tingling] : tingling [Constant] : constant [Household chores] : household chores [Leisure] : leisure [Sleep] : sleep [Meds] : meds [Injection therapy] : injection therapy [Sitting] : sitting [Standing] : standing [Walking] : walking [Physical therapy] : physical therapy [FreeTextEntry1] : 8/6/2024: CHRISS BRIZUELA is a 73 year-old woman presenting for a NPV (Aster) for a history of chronic low back pain. The patient notes having pain in the low back since 2016. The patient notes that pain has gotten progressively worse over time. Has had treatment with PT, ESIs in the past which have provided some improvement of pain. Is taking cyclobenzaprine. At this point, patient notes an aching pain in the left low back and left gluteal region with radiation to the left posterolateral thigh and anterior leg. Notes burning over this distribution. No focal weakness. Has had to use a walker to assist with ambulation.  The patient states that average pain over the past week was 8/10 in severity. Mood: Patient notes depression/anxiety due to her pain. Sleep: Patient notes significant difficulty with sleep 2/2 pain. Takes cyclobenzaprine prn. [] : no [FreeTextEntry6] : numbness and tingling in L foot  [FreeTextEntry7] : L leg  [FreeTextEntry9] : bending forward  [de-identified] : x-ray and MRI

## 2024-08-06 NOTE — PHYSICAL EXAM
[de-identified] : Gen: NAD Head: NC/AT Eyes: wears glasses, no scleral icterus ENT: mucous membranes moist CV: No JVD Lungs: nonlabored breathing Abd: soft, NT/ND Ext: full ROM in all extremities, no peripheral edema Back: +TTP in the left low lumbar facet region, +SLR on the left Neuro: CN intact; decreased sensation over L5 dermatome on the left LEs +5 L +5 R hip flexion +5 L +5 R leg extension +5 L +5 R leg flexion +5 L +5 R foot dorsiflexion +5 L +5 R foot plantarflexion +5 L +5 R EHL extension Psych: normal affect Skin: no visible lesions

## 2024-08-06 NOTE — PHYSICAL EXAM
[de-identified] : Gen: NAD Head: NC/AT Eyes: wears glasses, no scleral icterus ENT: mucous membranes moist CV: No JVD Lungs: nonlabored breathing Abd: soft, NT/ND Ext: full ROM in all extremities, no peripheral edema Back: +TTP in the left low lumbar facet region, +SLR on the left Neuro: CN intact; decreased sensation over L5 dermatome on the left LEs +5 L +5 R hip flexion +5 L +5 R leg extension +5 L +5 R leg flexion +5 L +5 R foot dorsiflexion +5 L +5 R foot plantarflexion +5 L +5 R EHL extension Psych: normal affect Skin: no visible lesions

## 2024-08-06 NOTE — DISCUSSION/SUMMARY
[de-identified] : CHRISS BRIZUELA is a 73 year-old woman presenting for a NPV for a history of chronic low back pain.   Prior treatment: 3/22/2023: LEFT L4-L5, L5-S1 TFESI w/ MAC w/ 50% improvement of pain and function  Physical therapy x2 months Cyclobenzaprine Patient has participated and failed at least 6 weeks of conservative therapy including physical therapy and a prescribed home exercise program as well as 6 weeks of activity modifications, including heat, ice, rest, and over the counter medications.  Plan: 1) MRI lumbar spine images reviewed with the patient. 2) Schedule LEFT L4-L5, L5-S1 TFESI (IN/SN) w/ MAC. The procedure was explained to the patient in detail. Reviewed risks, benefits, and alternatives with the patient. Some risks discussed included temporary increase in pain, bleeding, infection, and side effects from steroids. The patient expressed understanding and would like to proceed.  3) Continue cyclobenzaprine 5mg prn; denies AEs 4) Continue gabapentin 600mg BID; denies AEs 5) Continue naproxen 500mg prn 6) RTC post procedure

## 2024-08-23 ENCOUNTER — APPOINTMENT (OUTPATIENT)
Dept: ORTHOPEDIC SURGERY | Facility: CLINIC | Age: 74
End: 2024-08-23
Payer: MEDICARE

## 2024-08-23 VITALS — BODY MASS INDEX: 29.99 KG/M2 | WEIGHT: 180 LBS | HEIGHT: 65 IN

## 2024-08-23 DIAGNOSIS — M75.101 UNSPECIFIED ROTATOR CUFF TEAR OR RUPTURE OF RIGHT SHOULDER, NOT SPECIFIED AS TRAUMATIC: ICD-10-CM

## 2024-08-23 PROCEDURE — 99213 OFFICE O/P EST LOW 20 MIN: CPT

## 2024-08-23 NOTE — PHYSICAL EXAM
[Right] : right shoulder [Standing] : standing [Moderate] : moderate [] : motor and sensory intact distally [FreeTextEntry8] : improved

## 2024-08-23 NOTE — REASON FOR VISIT
[FreeTextEntry2] : 08/23/2024 Doing PT for right shoulder, feeling well, no pain 7/12/24: Pt is here for right shoulder pain that began 7/4/24. She was seen at urgent care where she was referred for PT and an MRI. She is taking Gabapentin, Flexeril, Meloxicam to treat her pain. She has noticed improvement.

## 2024-08-23 NOTE — HISTORY OF PRESENT ILLNESS
[7] : 7 [3] : 3 [Tightness] : tightness [de-identified] : 07/04/24: Pt is a 72 y/o female presenting of right shoulder pain. Pain started a few days ago. No injury or trauma. She has been doing PT for sciatica. Advil for pain. No numbness/tingling.  Can't lift arm overhead. No Neck pain.  Hx of right rotator cuff Sx 2018.  [] : no [FreeTextEntry1] : right shoulder pain

## 2024-08-28 ENCOUNTER — APPOINTMENT (OUTPATIENT)
Dept: PAIN MANAGEMENT | Facility: CLINIC | Age: 74
End: 2024-08-28
Payer: MEDICARE

## 2024-08-28 DIAGNOSIS — M54.17 RADICULOPATHY, LUMBOSACRAL REGION: ICD-10-CM

## 2024-08-28 DIAGNOSIS — M51.16 INTERVERTEBRAL DISC DISORDERS WITH RADICULOPATHY, LUMBAR REGION: ICD-10-CM

## 2024-08-28 PROCEDURE — 64484 NJX AA&/STRD TFRM EPI L/S EA: CPT | Mod: LT

## 2024-08-28 PROCEDURE — 64483 NJX AA&/STRD TFRM EPI L/S 1: CPT | Mod: LT

## 2024-08-28 NOTE — PROCEDURE
[FreeTextEntry1] : LEFT L4-L5, L5-S1 transforaminal epidural steroid injection [FreeTextEntry2] : chronic lumbar radiculopathy   [FreeTextEntry3] : Procedure Date: 08/28/2024   Preoperative Diagnosis: chronic lumbar radiculopathy   Procedure: LEFT L4-L5, L5-S1 transforaminal epidural steroid injection under fluoroscopic guidance  Anesthesia: MAC  Complications: none   EBL: none   Procedure in detail:   Patient was seen and examined. Risks, benefits, and alternatives for the procedure were discussed with the patient in detail. The patient expressed understanding, gave written and verbal consent, and placed themselves in a prone position on the procedure table. Skin overlying the lumbosacral spine was prepped with chloraprep and draped in the usual sterile fashion. Fluoroscopic images were obtained to identify the L4 and L5 vertebral bodies. Target was the 6 o'clock position under the LEFT pedicle at the L4 and L5 vertebral level. Skin overlying the target was marked and infiltrated with 1% lidocaine. Using two 22 gauge, 5 inch spinal needles, these were inserted and advanced under intermittent fluoroscopic guidance. When felt to be engaged in the epidural space, lateral view was used to confirm depth. After negative aspiration for heme/csf, contrast was injected under live fluoroscopy, which showed contrast spread consistent with epidural flow. No evidence of intravascular or intrathecal uptake. At this point, a total of 2ml of injectate, which consisted of 1ml of 6mg/ml betamethasone and 1ml of normal saline were injected through each needle. The needles were restyletted and withdrawn, a band aid was placed over the injection site. Patient tolerated the procedure well. The patient recovered uneventfully and was discharged home in stable condition.

## 2024-09-20 ENCOUNTER — APPOINTMENT (OUTPATIENT)
Dept: PAIN MANAGEMENT | Facility: CLINIC | Age: 74
End: 2024-09-20
Payer: MEDICARE

## 2024-09-20 VITALS — HEIGHT: 65 IN | BODY MASS INDEX: 29.82 KG/M2 | WEIGHT: 179 LBS

## 2024-09-20 DIAGNOSIS — M51.16 INTERVERTEBRAL DISC DISORDERS WITH RADICULOPATHY, LUMBAR REGION: ICD-10-CM

## 2024-09-20 DIAGNOSIS — M54.17 RADICULOPATHY, LUMBOSACRAL REGION: ICD-10-CM

## 2024-09-20 DIAGNOSIS — M25.552 PAIN IN LEFT HIP: ICD-10-CM

## 2024-09-20 PROCEDURE — 99213 OFFICE O/P EST LOW 20 MIN: CPT

## 2024-09-20 NOTE — DISCUSSION/SUMMARY
[de-identified] : CHRISS BRIZUELA is a 73 year-old woman presenting for a RPV for a history of chronic low back pain.   Prior treatment: 8/28/2024 LEFT L4-L5, L5-S1 TFESI w/ MAC w/ 50% improvement of pain and function 3/22/2023: LEFT L4-L5, L5-S1 TFESI w/ MAC w/ 50% improvement of pain and function  Physical therapy x2 months Cyclobenzaprine Patient has participated and failed at least 6 weeks of conservative therapy including physical therapy and a prescribed home exercise program as well as 6 weeks of activity modifications, including heat, ice, rest, and over the counter medications.  Plan: 1) MRI lumbar spine images reviewed with the patient. 2) Consider repeat LEFT L4-L5, L5-S1 TFESI (IN/SN) w/ MAC in the future 3) Continue cyclobenzaprine 5mg prn; denies AEs 4) Continue gabapentin 600mg BID; denies AEs 5) Continue naproxen 500mg prn 6) RTC prn

## 2024-09-20 NOTE — DISCUSSION/SUMMARY
[de-identified] : CHRISS BRIZUELA is a 73 year-old woman presenting for a RPV for a history of chronic low back pain.   Prior treatment: 8/28/2024 LEFT L4-L5, L5-S1 TFESI w/ MAC w/ 50% improvement of pain and function 3/22/2023: LEFT L4-L5, L5-S1 TFESI w/ MAC w/ 50% improvement of pain and function  Physical therapy x2 months Cyclobenzaprine Patient has participated and failed at least 6 weeks of conservative therapy including physical therapy and a prescribed home exercise program as well as 6 weeks of activity modifications, including heat, ice, rest, and over the counter medications.  Plan: 1) MRI lumbar spine images reviewed with the patient. 2) Consider repeat LEFT L4-L5, L5-S1 TFESI (IN/SN) w/ MAC in the future 3) Continue cyclobenzaprine 5mg prn; denies AEs 4) Continue gabapentin 600mg BID; denies AEs 5) Continue naproxen 500mg prn 6) RTC prn

## 2024-09-20 NOTE — HISTORY OF PRESENT ILLNESS
[Lower back] : lower back [Left Leg] : left leg [10] : 10 [7] : 7 [Burning] : burning [Dull/Aching] : dull/aching [Radiating] : radiating [Throbbing] : throbbing [Tightness] : tightness [Tingling] : tingling [Constant] : constant [Household chores] : household chores [Leisure] : leisure [Sleep] : sleep [Meds] : meds [Injection therapy] : injection therapy [Sitting] : sitting [Standing] : standing [Walking] : walking [Physical therapy] : physical therapy [4] : 4 [6] : 6 [FreeTextEntry1] : 9/20/2024 CHRISS BRIZUELA is a 73 year-old woman presenting for a RPV for a history of chronic low back pain. The patient underwent a LEFT L4-L5, L5-S1 TFESI w/ MAC on 8/28/2024. The patient notes having 50% improvement of pain and function since the procedure.  The patient states that average pain over the past week was 5/10 in severity.  Mood: depressed Sleep: Patient still notes some difficulty with sleep 2/2 pain.   8/6/2024: CHRISS BRIZUELA is a 73 year-old woman presenting for a NPV (Aster) for a history of chronic low back pain. The patient notes having pain in the low back since 2016. The patient notes that pain has gotten progressively worse over time. Has had treatment with PT, ESIs in the past which have provided some improvement of pain. Is taking cyclobenzaprine. At this point, patient notes an aching pain in the left low back and left gluteal region with radiation to the left posterolateral thigh and anterior leg. Notes burning over this distribution. No focal weakness. Has had to use a walker to assist with ambulation.  The patient states that average pain over the past week was 8/10 in severity. Mood: Patient notes depression/anxiety due to her pain. Sleep: Patient notes significant difficulty with sleep 2/2 pain. Takes cyclobenzaprine prn. [] : no [FreeTextEntry6] : numbness and tingling in L foot  [FreeTextEntry7] : L leg  [FreeTextEntry9] : bending forward  [de-identified] : x-ray and MRI [TWNoteComboBox1] : 70%

## 2024-09-20 NOTE — HISTORY OF PRESENT ILLNESS
[Lower back] : lower back [Left Leg] : left leg [10] : 10 [7] : 7 [Burning] : burning [Dull/Aching] : dull/aching [Radiating] : radiating [Throbbing] : throbbing [Tightness] : tightness [Tingling] : tingling [Constant] : constant [Household chores] : household chores [Leisure] : leisure [Sleep] : sleep [Meds] : meds [Injection therapy] : injection therapy [Sitting] : sitting [Standing] : standing [Walking] : walking [Physical therapy] : physical therapy [4] : 4 [6] : 6 [FreeTextEntry1] : 9/20/2024 CHRISS BRIZUELA is a 73 year-old woman presenting for a RPV for a history of chronic low back pain. The patient underwent a LEFT L4-L5, L5-S1 TFESI w/ MAC on 8/28/2024. The patient notes having 50% improvement of pain and function since the procedure.  The patient states that average pain over the past week was 5/10 in severity.  Mood: depressed Sleep: Patient still notes some difficulty with sleep 2/2 pain.   8/6/2024: CHRISS BRIZUELA is a 73 year-old woman presenting for a NPV (Aster) for a history of chronic low back pain. The patient notes having pain in the low back since 2016. The patient notes that pain has gotten progressively worse over time. Has had treatment with PT, ESIs in the past which have provided some improvement of pain. Is taking cyclobenzaprine. At this point, patient notes an aching pain in the left low back and left gluteal region with radiation to the left posterolateral thigh and anterior leg. Notes burning over this distribution. No focal weakness. Has had to use a walker to assist with ambulation.  The patient states that average pain over the past week was 8/10 in severity. Mood: Patient notes depression/anxiety due to her pain. Sleep: Patient notes significant difficulty with sleep 2/2 pain. Takes cyclobenzaprine prn. [] : no [FreeTextEntry6] : numbness and tingling in L foot  [FreeTextEntry7] : L leg  [FreeTextEntry9] : bending forward  [de-identified] : x-ray and MRI [TWNoteComboBox1] : 70%

## 2024-09-20 NOTE — PHYSICAL EXAM
[de-identified] : Gen: NAD Head: NC/AT Eyes: wears glasses, no scleral icterus ENT: mucous membranes moist CV: No JVD Lungs: nonlabored breathing Abd: soft, NT/ND Ext: full ROM in all extremities, no peripheral edema Back: +TTP in the left low lumbar facet region, +SLR on the left Neuro: CN intact; decreased sensation over L5 dermatome on the left LEs +5 L +5 R hip flexion +5 L +5 R leg extension +5 L +5 R leg flexion +5 L +5 R foot dorsiflexion +5 L +5 R foot plantarflexion +5 L +5 R EHL extension Psych: normal affect Skin: no visible lesions

## 2024-09-20 NOTE — PHYSICAL EXAM
[de-identified] : Gen: NAD Head: NC/AT Eyes: wears glasses, no scleral icterus ENT: mucous membranes moist CV: No JVD Lungs: nonlabored breathing Abd: soft, NT/ND Ext: full ROM in all extremities, no peripheral edema Back: +TTP in the left low lumbar facet region, +SLR on the left Neuro: CN intact; decreased sensation over L5 dermatome on the left LEs +5 L +5 R hip flexion +5 L +5 R leg extension +5 L +5 R leg flexion +5 L +5 R foot dorsiflexion +5 L +5 R foot plantarflexion +5 L +5 R EHL extension Psych: normal affect Skin: no visible lesions

## 2024-11-20 ENCOUNTER — APPOINTMENT (OUTPATIENT)
Dept: PAIN MANAGEMENT | Facility: CLINIC | Age: 74
End: 2024-11-20

## 2025-01-03 ENCOUNTER — OUTPATIENT (OUTPATIENT)
Dept: OUTPATIENT SERVICES | Facility: HOSPITAL | Age: 75
LOS: 1 days | End: 2025-01-03
Payer: MEDICARE

## 2025-01-03 VITALS
HEART RATE: 70 BPM | OXYGEN SATURATION: 96 % | HEIGHT: 65 IN | SYSTOLIC BLOOD PRESSURE: 140 MMHG | RESPIRATION RATE: 16 BRPM | TEMPERATURE: 98 F | DIASTOLIC BLOOD PRESSURE: 66 MMHG | WEIGHT: 177.03 LBS

## 2025-01-03 DIAGNOSIS — Z98.890 OTHER SPECIFIED POSTPROCEDURAL STATES: Chronic | ICD-10-CM

## 2025-01-03 DIAGNOSIS — E11.9 TYPE 2 DIABETES MELLITUS WITHOUT COMPLICATIONS: ICD-10-CM

## 2025-01-03 DIAGNOSIS — M54.16 RADICULOPATHY, LUMBAR REGION: ICD-10-CM

## 2025-01-03 DIAGNOSIS — Z01.818 ENCOUNTER FOR OTHER PREPROCEDURAL EXAMINATION: ICD-10-CM

## 2025-01-03 DIAGNOSIS — Z96.651 PRESENCE OF RIGHT ARTIFICIAL KNEE JOINT: Chronic | ICD-10-CM

## 2025-01-03 LAB
A1C WITH ESTIMATED AVERAGE GLUCOSE RESULT: 6.6 % — HIGH (ref 4–5.6)
ANION GAP SERPL CALC-SCNC: 7 MMOL/L — SIGNIFICANT CHANGE UP (ref 5–17)
BUN SERPL-MCNC: 16 MG/DL — SIGNIFICANT CHANGE UP (ref 7–23)
CALCIUM SERPL-MCNC: 10.1 MG/DL — SIGNIFICANT CHANGE UP (ref 8.5–10.1)
CHLORIDE SERPL-SCNC: 112 MMOL/L — HIGH (ref 96–108)
CO2 SERPL-SCNC: 26 MMOL/L — SIGNIFICANT CHANGE UP (ref 22–31)
CREAT SERPL-MCNC: 0.75 MG/DL — SIGNIFICANT CHANGE UP (ref 0.5–1.3)
EGFR: 84 ML/MIN/1.73M2 — SIGNIFICANT CHANGE UP
ESTIMATED AVERAGE GLUCOSE: 143 MG/DL — HIGH (ref 68–114)
GLUCOSE SERPL-MCNC: 118 MG/DL — HIGH (ref 70–99)
HCT VFR BLD CALC: 40 % — SIGNIFICANT CHANGE UP (ref 34.5–45)
HGB BLD-MCNC: 13.3 G/DL — SIGNIFICANT CHANGE UP (ref 11.5–15.5)
MCHC RBC-ENTMCNC: 30.6 PG — SIGNIFICANT CHANGE UP (ref 27–34)
MCHC RBC-ENTMCNC: 33.3 G/DL — SIGNIFICANT CHANGE UP (ref 32–36)
MCV RBC AUTO: 92.2 FL — SIGNIFICANT CHANGE UP (ref 80–100)
NRBC # BLD: 0 /100 WBCS — SIGNIFICANT CHANGE UP (ref 0–0)
PLATELET # BLD AUTO: 292 K/UL — SIGNIFICANT CHANGE UP (ref 150–400)
POTASSIUM SERPL-MCNC: 4.3 MMOL/L — SIGNIFICANT CHANGE UP (ref 3.5–5.3)
POTASSIUM SERPL-SCNC: 4.3 MMOL/L — SIGNIFICANT CHANGE UP (ref 3.5–5.3)
RBC # BLD: 4.34 M/UL — SIGNIFICANT CHANGE UP (ref 3.8–5.2)
RBC # FLD: 12.8 % — SIGNIFICANT CHANGE UP (ref 10.3–14.5)
SODIUM SERPL-SCNC: 145 MMOL/L — SIGNIFICANT CHANGE UP (ref 135–145)
WBC # BLD: 6.01 K/UL — SIGNIFICANT CHANGE UP (ref 3.8–10.5)
WBC # FLD AUTO: 6.01 K/UL — SIGNIFICANT CHANGE UP (ref 3.8–10.5)

## 2025-01-03 PROCEDURE — 83036 HEMOGLOBIN GLYCOSYLATED A1C: CPT

## 2025-01-03 PROCEDURE — 86900 BLOOD TYPING SEROLOGIC ABO: CPT

## 2025-01-03 PROCEDURE — 93005 ELECTROCARDIOGRAM TRACING: CPT

## 2025-01-03 PROCEDURE — 87641 MR-STAPH DNA AMP PROBE: CPT

## 2025-01-03 PROCEDURE — 93010 ELECTROCARDIOGRAM REPORT: CPT

## 2025-01-03 PROCEDURE — 85027 COMPLETE CBC AUTOMATED: CPT

## 2025-01-03 PROCEDURE — 86901 BLOOD TYPING SEROLOGIC RH(D): CPT

## 2025-01-03 PROCEDURE — 36415 COLL VENOUS BLD VENIPUNCTURE: CPT

## 2025-01-03 PROCEDURE — 80048 BASIC METABOLIC PNL TOTAL CA: CPT

## 2025-01-03 PROCEDURE — G0463: CPT

## 2025-01-03 PROCEDURE — 87640 STAPH A DNA AMP PROBE: CPT

## 2025-01-03 PROCEDURE — 86850 RBC ANTIBODY SCREEN: CPT

## 2025-01-03 RX ORDER — DM/PSEUDOEPHED/ACETAMINOPHEN 10-30-250
25 CAPSULE ORAL ONCE
Refills: 0 | Status: DISCONTINUED | OUTPATIENT
Start: 2025-01-16 | End: 2025-01-20

## 2025-01-03 RX ORDER — GLUCAGON 3 MG/1
1 POWDER NASAL ONCE
Refills: 0 | Status: DISCONTINUED | OUTPATIENT
Start: 2025-01-16 | End: 2025-01-20

## 2025-01-03 RX ORDER — DM/PSEUDOEPHED/ACETAMINOPHEN 10-30-250
12.5 CAPSULE ORAL ONCE
Refills: 0 | Status: DISCONTINUED | OUTPATIENT
Start: 2025-01-16 | End: 2025-01-20

## 2025-01-03 RX ORDER — DM/PSEUDOEPHED/ACETAMINOPHEN 10-30-250
15 CAPSULE ORAL ONCE
Refills: 0 | Status: DISCONTINUED | OUTPATIENT
Start: 2025-01-16 | End: 2025-01-20

## 2025-01-03 NOTE — H&P PST ADULT - PRIMARY CARE PROVIDER
Dr Ciro Thomason ( PMD) [phone], Dr. Zackery Vela ( card) [phone] Dr Ciro Thomason ( PMD) [phone], Dr. Zackery Vela ( card) [phone], Dr Craig Perlman ( endo) [phone]

## 2025-01-03 NOTE — H&P PST ADULT - HISTORY OF PRESENT ILLNESS
This is a  74 year old female with PMHx of  HTN, DM, peripheral neuropathy and GERD  for PST evaluation today. She is scheduled for  L3- S1 Laminectomy, plastic closure utilizing fluoroscopy  with Dr. James  on 1/16/2025. Patient reports that she has  back pain which radiates to her  left lower leg, then  she is unable walk after few minutes. Sometimes she walks with walker. Denies any lower extremity weakness.   Denies chest pain , SOB or palpitations at this time.       This is a  74 year old female with PMHx of  HTN, DM, peripheral neuropathy and GERD  for PST evaluation today. She is scheduled for  L3- S1 Laminectomy, plastic closure utilizing fluoroscopy  with Dr. James  on 1/16/2025. Patient reports that she has  back pain which radiates to her  left lower leg, then  she is unable walk after few minutes. Sometimes she walks with walker. Denies any lower extremity weakness, numbness or tingling. Denies chest pain , SOB or palpitations at this time.

## 2025-01-03 NOTE — H&P PST ADULT - PROBLEM SELECTOR PLAN 2
Labs :CBC,BMP, HgA1c and  T&S , done   Diagnostics: EKG done   Medical clearance with Dr. GONZALEZ and card 1/8/2025   on per surgeon.  Had an MRI outside and copy given Surgeon   Pre op instructions reviewed and given   Patient to take routine AM meds DOS with sips of water  DM meds instruction given ( see Diabetic meds sheet)  Stop Farxiga on 1/12/2025  Stop Mounjaro on 1/5/2025  Pt takes Tresiba up to 72 units sometimes depends on  FS during PM , advised the pt to have Diabetic meds plan with PMD on 1/8/2025.   Hibiclens scrub given   Verbalized understanding Labs :CBC,BMP, HgA1c and  T&S , done   Diagnostics: EKG done   Medical clearance with Dr. GONZALEZ and Card 1/8/2025   on per surgeon.  Had an MRI outside and copy given Surgeon   Pre op instructions reviewed and given   Patient to take routine AM meds DOS with sips of water  DM meds instruction given ( see Diabetic meds sheet)  Stop Farxiga on 1/12/2025  Stop Mounjaro on 1/5/2025  Pt takes Tresiba up to 72 units sometimes depends on  FS during PM , advised the pt to have Diabetic meds plan with PMD on 1/8/2025.   Hibiclens scrub given   Verbalized understanding Labs :CBC,BMP, HgA1c and  T&S , done   Diagnostics: EKG done   Medical clearance with Dr. GONZALEZ and Card 1/8/2025   on per surgeon.  Had an MRI outside and copy given Surgeon   Pre op instructions reviewed and given   Patient to take routine AM meds DOS with sips of water  DM meds instruction given ( see Diabetic meds sheet)  Hold metformin 24 hous befor surgery   Stop Farxiga on 1/12/2025  Stop Mounjaro on 1/5/2025  Pt takes Tresiba up to 72 units sometimes depends on  FS during PM , advised the pt to have Diabetic meds plan with PMD on 1/8/2025.   Hibiclens scrub given   Verbalized understanding DM meds instruction given ( see Diabetic meds sheet)  Hold metformin 24 hours before surgery   Stop Farxiga on 1/13/2025  Stop Mounjaro on 1/5/2025  Pt takes Tresiba up to 72 units depends on  FS during PM  hours, advised the pt to have Diabetic meds plan discussed  with PMD on 1/8/2025. Patient stated her Endo is on vacation next week. Also she will call and clarify with Endo on 1/13/25)  Novolog takes with dinner, pt will also discuss with her Endo ( her FS runs 140-150 during evening hours, latest A1C :   6.8)

## 2025-01-03 NOTE — H&P PST ADULT - PROBLEM SELECTOR PLAN 3
Labs :CBC,BMP, HgA1c and  T&S , done   Diagnostics: EKG done   Medical clearance with Dr. GONZALEZ and Card 1/8/2025   on per surgeon.  Had an MRI outside and copy given Surgeon   Pre op instructions reviewed and given   Patient to take routine AM meds DOS with sips of water  Hibiclens scrub given   Verbalized understanding

## 2025-01-03 NOTE — H&P PST ADULT - NSICDXPASTMEDICALHX_GEN_ALL_CORE_FT
PAST MEDICAL HISTORY:  Diverticulitis     DM (diabetes mellitus)     GERD (gastroesophageal reflux disease)     HTN (hypertension)     Peripheral neuropathy

## 2025-01-03 NOTE — H&P PST ADULT - NSICDXFAMILYHX_GEN_ALL_CORE_FT
FAMILY HISTORY:  Mother  Still living? No  Family history of diabetes mellitus (DM), Age at diagnosis: Age Unknown  FH: heart disease, Age at diagnosis: Age Unknown  FH: HTN (hypertension), Age at diagnosis: Age Unknown    Aunt  Still living? No  FH: breast cancer, Age at diagnosis: Age Unknown

## 2025-01-03 NOTE — H&P PST ADULT - NSICDXPASTSURGICALHX_GEN_ALL_CORE_FT
PAST SURGICAL HISTORY:  H/O appendicostomy     H/O colonoscopy      PAST SURGICAL HISTORY:  H/O appendicostomy     H/O colonoscopy     S/P removal of left ovary     S/P shoulder surgery     S/P TKR (total knee replacement), right

## 2025-01-03 NOTE — H&P PST ADULT - PROBLEM SELECTOR PLAN 1
Scheduled for  L3- S1 Laminectomy, plastic closure utilizing fluoroscopy  with Dr. James  on 1/16/2025. Scheduled for  L3- S1 Laminectomy, plastic closure utilizing fluoroscopy  with Dr. James  on 1/16/2025.  Case discussed with Dr. Purcell

## 2025-01-03 NOTE — H&P PST ADULT - REASON FOR ADMISSION
"I have backpain which radiates to my left lower legs, then I am unable walk after few minutes" "I have back pain which radiates to my left lower leg, then I am unable walk after few minutes"

## 2025-01-04 LAB
MRSA PCR RESULT.: SIGNIFICANT CHANGE UP
S AUREUS DNA NOSE QL NAA+PROBE: SIGNIFICANT CHANGE UP

## 2025-01-11 RX ORDER — CEFAZOLIN SODIUM IN 0.9 % NACL 2 G/10 ML
2000 SYRINGE (ML) INTRAVENOUS ONCE
Refills: 0 | Status: COMPLETED | OUTPATIENT
Start: 2025-01-16 | End: 2025-01-16

## 2025-01-15 RX ORDER — SODIUM CHLORIDE 9 G/ML
1000 INJECTION, SOLUTION INTRAVENOUS
Refills: 0 | Status: DISCONTINUED | OUTPATIENT
Start: 2025-01-16 | End: 2025-01-16

## 2025-01-15 NOTE — ASU PATIENT PROFILE, ADULT - NSICDXPASTMEDICALHX_GEN_ALL_CORE_FT
PAST MEDICAL HISTORY:  Diverticulitis     DM (diabetes mellitus)     Gastroparesis     GERD (gastroesophageal reflux disease)     HTN (hypertension)     Peripheral neuropathy

## 2025-01-15 NOTE — ASU PATIENT PROFILE, ADULT - IS PATIENT PREGNANT?
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  After hours call the   hospital at [phone] or   [phone]. Ask for the BMT   fellow on-call            Follow-ups after your visit        Future tests that were ordered for you today     Open Future Orders        Priority Expected Expires Ordered    CBC with platelets differential Routine 7/21/2017 7/14/2018 7/14/2017    Basic metabolic panel Routine 7/21/2017 7/14/2018 7/14/2017            Who to contact     If you have questions or need follow up information about today's clinic visit or your schedule please contact [facility] B[facility]OD [facility] MARROW TRANSPLANT directly at [phone].  Normal or non-critical lab and imaging results will be communicated to you by Treventishart, letter or phone within 4 business days after the clinic has received the results. If you do not hear from us within 7 days, please contact the clinic through Treventishart or phone. If you have a critical or abnormal lab result, we will notify you by phone as soon as possible.  Submit refill requests through Quantapore or call your pharmacy and they will forward the refill request to us. Please allow 3 business days for your refill to be completed.          Additional Information About Your Visit        TreventisharBrickfish Information     Quantapore gives you secure access to your electronic health record. If  you see a primary care provider, you can also send messages to your care team and make appointments. If you have questions, please call your primary care clinic.  If you do not have a primary care provider, please call [phone] and they will assist you.        Care EveryWhere ID     This is your Care EveryWhere ID. This could be used by other organizations to access your [facility] medical records  [id number]        Your Vitals Were     Pulse Temperature Respirations Pulse Oximetry BMI (Body Mass Index)       76 98.4  F (36.9  C) (Oral) 16 97% 29.48 kg/m2        Blood Pressure from Last 3 Encounters:   07/14/17 122/64   07/13/17 125/73   06/29/17 103/64    Weight from Last 3 Encounters:   07/14/17 85.4 kg (188 lb 3.2 oz)   07/13/17 84.5 kg (186 lb 4.8 oz)   06/26/17 85.2 kg (187 lb 14.4 oz)              We Performed the Following     CBC with platelets differential     Comprehensive metabolic panel     Ferritin     Haptoglobin     Iron and iron binding capacity     Lactate Dehydrogenase     Reticulocyte count     Uric acid          Today's Medication Changes          These changes are accurate as of: 7/14/17  5:37 PM.  If you have any questions, ask your nurse or doctor.               Stop taking these medicines if you haven't already. Please contact your care team if you have questions.     sucralfate 1 GM/10ML suspension   Commonly known as:  CARAFATE   Stopped by:  Renato Napoles MD                    Recent Review Flowsheet Data     BMT Recent Results Latest Ref Rng & Units 7/10/2017 7/10/2017 7/10/2017 7/11/2017 7/12/2017 7/13/2017 7/14/2017    WBC 4.0 - 11.0 10e9/L - - - 18.4(H) 20.3(H) 20.6(H) 22.6(H)    Hemoglobin 13.3 - 17.7 g/dL - - - 9.2(L) 9.4(L) 9.3(L) 9.7(L)    Platelet Count 150 - 450 10e9/L - - - 53(L) 56(L) 50(L) 64(L)    Neutrophils (Absolute) 1.6 - 8.3 10e9/L - - - - - - 17.9(H)    Blasts (Absolute) 0 10e9/L - - - - - - -    INR 0.86 - 1.14 - - - - - - -    Sodium 133 - 144 mmol/L - - - 139  138 139 141    Potassium 3.4 - 5.3 mmol/L - - - 3.6 3.6 3.5 3.5    Chloride 94 - 109 mmol/L - - - 110(H) 110(H) 108 107    Glucose 70 - 99 mg/dL 94 84 120(H) 105(H) 100(H) 120(H) 126(H)    Urea Nitrogen 7 - 30 mg/dL - - - 15 15 22 22    Creatinine 0.66 - 1.25 mg/dL - - - 1.21 1.20 1.33(H) 1.44(H)    Calcium (Total) 8.5 - 10.1 mg/dL - - - 8.1(L) 8.2(L) 8.3(L) 8.2(L)    Protein (Total) 6.8 - 8.8 g/dL - - - 7.1 7.5 - 7.8    Albumin 3.4 - 5.0 g/dL - - - 3.4 3.4 - 3.7    Bilirubin (Direct) 0.0 - 0.2 mg/dL - - - 0.2 - - -    Alkaline Phosphatase 40 - 150 U/L - - - 90 86 - 96    AST 0 - 45 U/L - - - 23 23 - 28    ALT 0 - 70 U/L - - - 12 20 - 22    MCV 78 - 100 fl - - - 83 87 81 84               Primary Care Provider Office Phone # Fax #    Parrish MARCUS Post [phone]       [facility] [address] [address]        Equal Access to Services     [facility]: Hadii aad ku hadasho Soomaali, waaxda luqadaha, qaybta kaalmada adeegyatl, waxay david bates . [facility] [phone].    ATENCIÓN: Si habla farida, tiene a greene disposición servicios gratuitos de asistencia lingüística. Chayito al [phone].    We comply with applicable federal civil rights laws and [place] laws. We do not discriminate on the basis of race, color, national origin, age, disability sex, sexual orientation or gender identity.            Thank you!     Thank you for choosing [facility] BLOOD [facility] MARROW TRANSPLANT  for your care. Our goal is always to provide you with excellent care. Hearing back from our patients is one way we can continue to improve our services. Please take a few minutes to complete the written survey that you may receive in the mail after your visit with us. Thank you!             Your Updated Medication List - Protect others around you: Learn how to safely use, store and throw away your medicines at www.disposemymeds.org.          This list is accurate as of: 7/14/17  5:37  PM.  Always use your most recent med list.                   Brand Name Dispense Instructions for use Diagnosis    acetaminophen 650 MG CR tablet    TYLENOL     650mg PO QHS and BID PRN. Max acetaminophen dose: 4000mg in 24 hrs.        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 Test 1 time per day.        PRILOSEC PO      Take 20 mg by mouth every morning    Bleeding diathesis (H)          no

## 2025-01-15 NOTE — ASU PATIENT PROFILE, ADULT - NSICDXPASTSURGICALHX_GEN_ALL_CORE_FT
PAST SURGICAL HISTORY:  H/O appendicostomy     H/O colonoscopy     S/P removal of left ovary     S/P removal of right ovary     S/P shoulder surgery     S/P TKR (total knee replacement), right

## 2025-01-15 NOTE — ASU PATIENT PROFILE, ADULT - FALL HARM RISK - RISK INTERVENTIONS
Communicate Fall Risk and Risk Factors to all staff, patient, and family/Reinforce activity limits and safety measures with patient and family/Sit up slowly, dangle for a short time, stand at bedside before walking/Visual Cue: Yellow wristband/Bed in lowest position, wheels locked, appropriate side rails in place/Call bell, personal items and telephone in reach/Instruct patient to call for assistance before getting out of bed or chair/Non-slip footwear when patient is out of bed/[place] to call system/Physically safe environment - no spills, clutter or unnecessary equipment/Purposeful Proactive Rounding/Room/bathroom lighting operational, light cord in reach

## 2025-01-16 ENCOUNTER — TRANSCRIPTION ENCOUNTER (OUTPATIENT)
Age: 75
End: 2025-01-16

## 2025-01-16 ENCOUNTER — INPATIENT (INPATIENT)
Facility: HOSPITAL | Age: 75
LOS: 3 days | Discharge: HOME | DRG: 520 | End: 2025-01-20
Attending: ORTHOPAEDIC SURGERY | Admitting: ORTHOPAEDIC SURGERY
Payer: MEDICARE

## 2025-01-16 VITALS
WEIGHT: 177.03 LBS | SYSTOLIC BLOOD PRESSURE: 159 MMHG | HEART RATE: 70 BPM | RESPIRATION RATE: 17 BRPM | TEMPERATURE: 98 F | OXYGEN SATURATION: 96 % | DIASTOLIC BLOOD PRESSURE: 68 MMHG | HEIGHT: 65 IN

## 2025-01-16 DIAGNOSIS — M54.16 RADICULOPATHY, LUMBAR REGION: ICD-10-CM

## 2025-01-16 DIAGNOSIS — Z98.890 OTHER SPECIFIED POSTPROCEDURAL STATES: Chronic | ICD-10-CM

## 2025-01-16 DIAGNOSIS — Z96.651 PRESENCE OF RIGHT ARTIFICIAL KNEE JOINT: Chronic | ICD-10-CM

## 2025-01-16 LAB
ABO RH CONFIRMATION: SIGNIFICANT CHANGE UP
ANION GAP SERPL CALC-SCNC: 8 MMOL/L — SIGNIFICANT CHANGE UP (ref 5–17)
BASOPHILS # BLD AUTO: 0.03 K/UL — SIGNIFICANT CHANGE UP (ref 0–0.2)
BASOPHILS NFR BLD AUTO: 0.5 % — SIGNIFICANT CHANGE UP (ref 0–2)
BUN SERPL-MCNC: 18 MG/DL — SIGNIFICANT CHANGE UP (ref 7–23)
CALCIUM SERPL-MCNC: 9.1 MG/DL — SIGNIFICANT CHANGE UP (ref 8.5–10.1)
CHLORIDE SERPL-SCNC: 110 MMOL/L — HIGH (ref 96–108)
CO2 SERPL-SCNC: 23 MMOL/L — SIGNIFICANT CHANGE UP (ref 22–31)
CREAT SERPL-MCNC: 0.82 MG/DL — SIGNIFICANT CHANGE UP (ref 0.5–1.3)
EGFR: 75 ML/MIN/1.73M2 — SIGNIFICANT CHANGE UP
EOSINOPHIL # BLD AUTO: 0.09 K/UL — SIGNIFICANT CHANGE UP (ref 0–0.5)
EOSINOPHIL NFR BLD AUTO: 1.4 % — SIGNIFICANT CHANGE UP (ref 0–6)
GLUCOSE SERPL-MCNC: 193 MG/DL — HIGH (ref 70–99)
HCT VFR BLD CALC: 37.1 % — SIGNIFICANT CHANGE UP (ref 34.5–45)
HGB BLD-MCNC: 12.8 G/DL — SIGNIFICANT CHANGE UP (ref 11.5–15.5)
IMM GRANULOCYTES NFR BLD AUTO: 0.5 % — SIGNIFICANT CHANGE UP (ref 0–0.9)
LYMPHOCYTES # BLD AUTO: 1.56 K/UL — SIGNIFICANT CHANGE UP (ref 1–3.3)
LYMPHOCYTES # BLD AUTO: 23.5 % — SIGNIFICANT CHANGE UP (ref 13–44)
MCHC RBC-ENTMCNC: 31.1 PG — SIGNIFICANT CHANGE UP (ref 27–34)
MCHC RBC-ENTMCNC: 34.5 G/DL — SIGNIFICANT CHANGE UP (ref 32–36)
MCV RBC AUTO: 90 FL — SIGNIFICANT CHANGE UP (ref 80–100)
MONOCYTES # BLD AUTO: 0.22 K/UL — SIGNIFICANT CHANGE UP (ref 0–0.9)
MONOCYTES NFR BLD AUTO: 3.3 % — SIGNIFICANT CHANGE UP (ref 2–14)
NEUTROPHILS # BLD AUTO: 4.71 K/UL — SIGNIFICANT CHANGE UP (ref 1.8–7.4)
NEUTROPHILS NFR BLD AUTO: 70.8 % — SIGNIFICANT CHANGE UP (ref 43–77)
NRBC # BLD: 0 /100 WBCS — SIGNIFICANT CHANGE UP (ref 0–0)
NRBC BLD-RTO: 0 /100 WBCS — SIGNIFICANT CHANGE UP (ref 0–0)
PLATELET # BLD AUTO: 213 K/UL — SIGNIFICANT CHANGE UP (ref 150–400)
POTASSIUM SERPL-MCNC: 4.1 MMOL/L — SIGNIFICANT CHANGE UP (ref 3.5–5.3)
POTASSIUM SERPL-SCNC: 4.1 MMOL/L — SIGNIFICANT CHANGE UP (ref 3.5–5.3)
RBC # BLD: 4.12 M/UL — SIGNIFICANT CHANGE UP (ref 3.8–5.2)
RBC # FLD: 12.8 % — SIGNIFICANT CHANGE UP (ref 10.3–14.5)
SODIUM SERPL-SCNC: 141 MMOL/L — SIGNIFICANT CHANGE UP (ref 135–145)
WBC # BLD: 6.64 K/UL — SIGNIFICANT CHANGE UP (ref 3.8–10.5)
WBC # FLD AUTO: 6.64 K/UL — SIGNIFICANT CHANGE UP (ref 3.8–10.5)

## 2025-01-16 DEVICE — SURGIFLO MATRIX WITH THROMBIN KIT: Type: IMPLANTABLE DEVICE | Status: FUNCTIONAL

## 2025-01-16 DEVICE — DURASEAL SEALANT 5ML: Type: IMPLANTABLE DEVICE | Status: FUNCTIONAL

## 2025-01-16 RX ORDER — MILK THISTLE 150 MG
1 CAPSULE ORAL
Refills: 0 | DISCHARGE

## 2025-01-16 RX ORDER — INSULIN ASPART 100/ML
0 VIAL (ML) SUBCUTANEOUS
Refills: 0 | DISCHARGE

## 2025-01-16 RX ORDER — DM/PSEUDOEPHED/ACETAMINOPHEN 10-30-250
12.5 CAPSULE ORAL ONCE
Refills: 0 | Status: DISCONTINUED | OUTPATIENT
Start: 2025-01-16 | End: 2025-01-20

## 2025-01-16 RX ORDER — GLUCAGON 3 MG/1
1 POWDER NASAL ONCE
Refills: 0 | Status: DISCONTINUED | OUTPATIENT
Start: 2025-01-16 | End: 2025-01-20

## 2025-01-16 RX ORDER — DM/PSEUDOEPHED/ACETAMINOPHEN 10-30-250
25 CAPSULE ORAL ONCE
Refills: 0 | Status: DISCONTINUED | OUTPATIENT
Start: 2025-01-16 | End: 2025-01-20

## 2025-01-16 RX ORDER — DM/PSEUDOEPHED/ACETAMINOPHEN 10-30-250
15 CAPSULE ORAL ONCE
Refills: 0 | Status: DISCONTINUED | OUTPATIENT
Start: 2025-01-16 | End: 2025-01-20

## 2025-01-16 RX ORDER — INSULIN LISPRO 100/ML
VIAL (ML) SUBCUTANEOUS
Refills: 0 | Status: DISCONTINUED | OUTPATIENT
Start: 2025-01-16 | End: 2025-01-20

## 2025-01-16 RX ORDER — CYANOCOBALAMIN 1000 UG/ML
1 INJECTION, SOLUTION INTRAMUSCULAR; SUBCUTANEOUS
Refills: 0 | DISCHARGE

## 2025-01-16 RX ORDER — LOSARTAN POTASSIUM 100 MG
100 TABLET ORAL DAILY
Refills: 0 | Status: DISCONTINUED | OUTPATIENT
Start: 2025-01-16 | End: 2025-01-17

## 2025-01-16 RX ORDER — ONDANSETRON 4 MG/1
4 TABLET, ORALLY DISINTEGRATING ORAL ONCE
Refills: 0 | Status: DISCONTINUED | OUTPATIENT
Start: 2025-01-16 | End: 2025-01-16

## 2025-01-16 RX ORDER — POLYETHYLENE GLYCOL 3350 17 G/17G
17 POWDER, FOR SOLUTION ORAL AT BEDTIME
Refills: 0 | Status: DISCONTINUED | OUTPATIENT
Start: 2025-01-16 | End: 2025-01-20

## 2025-01-16 RX ORDER — SODIUM CHLORIDE 9 G/ML
1000 INJECTION, SOLUTION INTRAVENOUS
Refills: 0 | Status: DISCONTINUED | OUTPATIENT
Start: 2025-01-16 | End: 2025-01-16

## 2025-01-16 RX ORDER — FAMOTIDINE 20 MG/1
1 TABLET, FILM COATED ORAL
Refills: 0 | DISCHARGE

## 2025-01-16 RX ORDER — TRAMADOL HYDROCHLORIDE 100 MG/1
50 TABLET, EXTENDED RELEASE ORAL EVERY 6 HOURS
Refills: 0 | Status: DISCONTINUED | OUTPATIENT
Start: 2025-01-16 | End: 2025-01-20

## 2025-01-16 RX ORDER — CEFAZOLIN SODIUM IN 0.9 % NACL 2 G/10 ML
2000 SYRINGE (ML) INTRAVENOUS EVERY 8 HOURS
Refills: 0 | Status: COMPLETED | OUTPATIENT
Start: 2025-01-16 | End: 2025-01-19

## 2025-01-16 RX ORDER — METFORMIN 850 MG/1
1 TABLET ORAL
Refills: 0 | DISCHARGE

## 2025-01-16 RX ORDER — ROSUVASTATIN CALCIUM 10 MG/1
5 TABLET, FILM COATED ORAL AT BEDTIME
Refills: 0 | Status: DISCONTINUED | OUTPATIENT
Start: 2025-01-16 | End: 2025-01-20

## 2025-01-16 RX ORDER — DIMENHYDRINATE 50 MG
1 TABLET ORAL
Refills: 0 | DISCHARGE

## 2025-01-16 RX ORDER — HYDROMORPHONE HYDROCHLORIDE 4 MG/ML
0.5 INJECTION, SOLUTION INTRAMUSCULAR; INTRAVENOUS; SUBCUTANEOUS
Refills: 0 | Status: DISCONTINUED | OUTPATIENT
Start: 2025-01-16 | End: 2025-01-16

## 2025-01-16 RX ORDER — ONDANSETRON 4 MG/1
4 TABLET, ORALLY DISINTEGRATING ORAL EVERY 6 HOURS
Refills: 0 | Status: DISCONTINUED | OUTPATIENT
Start: 2025-01-16 | End: 2025-01-20

## 2025-01-16 RX ORDER — INSULIN LISPRO 100/ML
VIAL (ML) SUBCUTANEOUS
Refills: 0 | Status: DISCONTINUED | OUTPATIENT
Start: 2025-01-16 | End: 2025-01-16

## 2025-01-16 RX ORDER — BACILLUS COAGULANS/INULIN 1B-250 MG
1 CAPSULE ORAL
Refills: 0 | DISCHARGE

## 2025-01-16 RX ORDER — OXYCODONE HYDROCHLORIDE 30 MG/1
10 TABLET ORAL EVERY 6 HOURS
Refills: 0 | Status: DISCONTINUED | OUTPATIENT
Start: 2025-01-16 | End: 2025-01-20

## 2025-01-16 RX ORDER — GABAPENTIN 300 MG/1
1 CAPSULE ORAL
Refills: 0 | DISCHARGE

## 2025-01-16 RX ORDER — INSULIN LISPRO 100/ML
VIAL (ML) SUBCUTANEOUS AT BEDTIME
Refills: 0 | Status: DISCONTINUED | OUTPATIENT
Start: 2025-01-16 | End: 2025-01-20

## 2025-01-16 RX ORDER — SENNOSIDES 8.6 MG
2 TABLET ORAL AT BEDTIME
Refills: 0 | Status: DISCONTINUED | OUTPATIENT
Start: 2025-01-16 | End: 2025-01-20

## 2025-01-16 RX ORDER — ROSUVASTATIN 40 MG/1
1 TABLET, FILM COATED ORAL
Refills: 0 | DISCHARGE

## 2025-01-16 RX ORDER — LOSARTAN POTASSIUM 100 MG/1
1 TABLET, FILM COATED ORAL
Refills: 0 | DISCHARGE

## 2025-01-16 RX ORDER — SODIUM CHLORIDE 9 G/ML
1000 INJECTION, SOLUTION INTRAVENOUS
Refills: 0 | Status: DISCONTINUED | OUTPATIENT
Start: 2025-01-16 | End: 2025-01-20

## 2025-01-16 RX ORDER — CHOLECALCIFEROL (VITAMIN D3) 125 MCG
60 CAPSULE ORAL
Refills: 0 | DISCHARGE

## 2025-01-16 RX ORDER — TIRZEPATIDE 7.5 MG/.5ML
12.5 INJECTION, SOLUTION SUBCUTANEOUS
Refills: 0 | DISCHARGE

## 2025-01-16 RX ORDER — INSULIN LISPRO 100/ML
VIAL (ML) SUBCUTANEOUS AT BEDTIME
Refills: 0 | Status: DISCONTINUED | OUTPATIENT
Start: 2025-01-16 | End: 2025-01-16

## 2025-01-16 RX ORDER — PANTOPRAZOLE 20 MG/1
40 TABLET, DELAYED RELEASE ORAL
Refills: 0 | Status: DISCONTINUED | OUTPATIENT
Start: 2025-01-16 | End: 2025-01-20

## 2025-01-16 RX ORDER — OXYCODONE HYDROCHLORIDE 30 MG/1
5 TABLET ORAL EVERY 6 HOURS
Refills: 0 | Status: DISCONTINUED | OUTPATIENT
Start: 2025-01-16 | End: 2025-01-20

## 2025-01-16 RX ORDER — DAPAGLIFLOZIN 5 MG/1
1 TABLET, FILM COATED ORAL
Refills: 0 | DISCHARGE

## 2025-01-16 RX ORDER — GABAPENTIN 800 MG/1
600 TABLET ORAL
Refills: 0 | Status: DISCONTINUED | OUTPATIENT
Start: 2025-01-16 | End: 2025-01-20

## 2025-01-16 RX ORDER — CHOLECALCIFEROL (VITAMIN D3) 10 MCG
1 TABLET ORAL
Refills: 0 | DISCHARGE

## 2025-01-16 RX ORDER — INSULIN DEGLUDEC INJECTION 100 U/ML
0 INJECTION, SOLUTION SUBCUTANEOUS
Refills: 0 | DISCHARGE

## 2025-01-16 RX ORDER — HYDROMORPHONE HYDROCHLORIDE 4 MG/ML
0.5 INJECTION, SOLUTION INTRAMUSCULAR; INTRAVENOUS; SUBCUTANEOUS EVERY 4 HOURS
Refills: 0 | Status: DISCONTINUED | OUTPATIENT
Start: 2025-01-16 | End: 2025-01-20

## 2025-01-16 RX ORDER — B COMPLEX, C NO.20/FOLIC ACID 1 MG
1 CAPSULE ORAL
Refills: 0 | DISCHARGE

## 2025-01-16 RX ADMIN — GABAPENTIN 600 MILLIGRAM(S): 800 TABLET ORAL at 17:50

## 2025-01-16 RX ADMIN — HYDROMORPHONE HYDROCHLORIDE 0.5 MILLIGRAM(S): 4 INJECTION, SOLUTION INTRAMUSCULAR; INTRAVENOUS; SUBCUTANEOUS at 14:34

## 2025-01-16 RX ADMIN — HYDROMORPHONE HYDROCHLORIDE 0.5 MILLIGRAM(S): 4 INJECTION, SOLUTION INTRAMUSCULAR; INTRAVENOUS; SUBCUTANEOUS at 14:02

## 2025-01-16 RX ADMIN — Medication 100 MILLIGRAM(S): at 18:17

## 2025-01-16 RX ADMIN — SODIUM CHLORIDE 75 MILLILITER(S): 9 INJECTION, SOLUTION INTRAVENOUS at 13:25

## 2025-01-16 RX ADMIN — Medication 2: at 17:50

## 2025-01-16 RX ADMIN — ROSUVASTATIN CALCIUM 5 MILLIGRAM(S): 10 TABLET, FILM COATED ORAL at 21:18

## 2025-01-16 RX ADMIN — Medication 5 MILLIGRAM(S): at 21:19

## 2025-01-16 RX ADMIN — Medication 1: at 22:10

## 2025-01-16 RX ADMIN — SODIUM CHLORIDE 75 MILLILITER(S): 9 INJECTION, SOLUTION INTRAVENOUS at 16:40

## 2025-01-16 RX ADMIN — HYDROMORPHONE HYDROCHLORIDE 0.5 MILLIGRAM(S): 4 INJECTION, SOLUTION INTRAMUSCULAR; INTRAVENOUS; SUBCUTANEOUS at 13:49

## 2025-01-16 NOTE — PATIENT PROFILE ADULT - FALL HARM RISK - RISK INTERVENTIONS

## 2025-01-16 NOTE — ASU DISCHARGE PLAN (ADULT/PEDIATRIC) - NS MD DC FALL RISK RISK
For information on Fall & Injury Prevention, visit: [email or web address] OR  [email or web address] OR  https://www.cdc.gov/steadi/patient.html

## 2025-01-16 NOTE — ASU DISCHARGE PLAN (ADULT/PEDIATRIC) - CARE PROVIDER_API CALL
Rancho James  Orthopaedic Surgery  [address]  [place] [address]  Phone: [phone]  Fax: [phone]  Follow Up Time:

## 2025-01-16 NOTE — ASU DISCHARGE PLAN (ADULT/PEDIATRIC) - FINANCIAL ASSISTANCE
[facility] provides services at a reduced cost to those who are determined to be eligible through [facility]’s financial assistance program. Information regarding [facility]’s financial assistance program can be found by going to [email or web address] or by calling [phone].

## 2025-01-17 LAB
ANION GAP SERPL CALC-SCNC: 7 MMOL/L — SIGNIFICANT CHANGE UP (ref 5–17)
BASOPHILS # BLD AUTO: 0.01 K/UL — SIGNIFICANT CHANGE UP (ref 0–0.2)
BASOPHILS NFR BLD AUTO: 0.1 % — SIGNIFICANT CHANGE UP (ref 0–2)
BUN SERPL-MCNC: 21 MG/DL — SIGNIFICANT CHANGE UP (ref 7–23)
CALCIUM SERPL-MCNC: 8.9 MG/DL — SIGNIFICANT CHANGE UP (ref 8.5–10.1)
CHLORIDE SERPL-SCNC: 108 MMOL/L — SIGNIFICANT CHANGE UP (ref 96–108)
CO2 SERPL-SCNC: 25 MMOL/L — SIGNIFICANT CHANGE UP (ref 22–31)
CREAT SERPL-MCNC: 0.77 MG/DL — SIGNIFICANT CHANGE UP (ref 0.5–1.3)
EGFR: 81 ML/MIN/1.73M2 — SIGNIFICANT CHANGE UP
EOSINOPHIL # BLD AUTO: 0 K/UL — SIGNIFICANT CHANGE UP (ref 0–0.5)
EOSINOPHIL NFR BLD AUTO: 0 % — SIGNIFICANT CHANGE UP (ref 0–6)
GLUCOSE SERPL-MCNC: 222 MG/DL — HIGH (ref 70–99)
HCT VFR BLD CALC: 36.1 % — SIGNIFICANT CHANGE UP (ref 34.5–45)
HGB BLD-MCNC: 12.4 G/DL — SIGNIFICANT CHANGE UP (ref 11.5–15.5)
IMM GRANULOCYTES NFR BLD AUTO: 0.5 % — SIGNIFICANT CHANGE UP (ref 0–0.9)
LYMPHOCYTES # BLD AUTO: 1.65 K/UL — SIGNIFICANT CHANGE UP (ref 1–3.3)
LYMPHOCYTES # BLD AUTO: 15 % — SIGNIFICANT CHANGE UP (ref 13–44)
MCHC RBC-ENTMCNC: 30.8 PG — SIGNIFICANT CHANGE UP (ref 27–34)
MCHC RBC-ENTMCNC: 34.3 G/DL — SIGNIFICANT CHANGE UP (ref 32–36)
MCV RBC AUTO: 89.8 FL — SIGNIFICANT CHANGE UP (ref 80–100)
MONOCYTES # BLD AUTO: 0.62 K/UL — SIGNIFICANT CHANGE UP (ref 0–0.9)
MONOCYTES NFR BLD AUTO: 5.6 % — SIGNIFICANT CHANGE UP (ref 2–14)
NEUTROPHILS # BLD AUTO: 8.66 K/UL — HIGH (ref 1.8–7.4)
NEUTROPHILS NFR BLD AUTO: 78.8 % — HIGH (ref 43–77)
NRBC # BLD: 0 /100 WBCS — SIGNIFICANT CHANGE UP (ref 0–0)
NRBC BLD-RTO: 0 /100 WBCS — SIGNIFICANT CHANGE UP (ref 0–0)
PLATELET # BLD AUTO: 221 K/UL — SIGNIFICANT CHANGE UP (ref 150–400)
POTASSIUM SERPL-MCNC: 4.6 MMOL/L — SIGNIFICANT CHANGE UP (ref 3.5–5.3)
POTASSIUM SERPL-SCNC: 4.6 MMOL/L — SIGNIFICANT CHANGE UP (ref 3.5–5.3)
RBC # BLD: 4.02 M/UL — SIGNIFICANT CHANGE UP (ref 3.8–5.2)
RBC # FLD: 12.7 % — SIGNIFICANT CHANGE UP (ref 10.3–14.5)
SODIUM SERPL-SCNC: 140 MMOL/L — SIGNIFICANT CHANGE UP (ref 135–145)
WBC # BLD: 10.99 K/UL — HIGH (ref 3.8–10.5)
WBC # FLD AUTO: 10.99 K/UL — HIGH (ref 3.8–10.5)

## 2025-01-17 RX ORDER — LOSARTAN POTASSIUM 100 MG
100 TABLET ORAL DAILY
Refills: 0 | Status: DISCONTINUED | OUTPATIENT
Start: 2025-01-17 | End: 2025-01-20

## 2025-01-17 RX ORDER — DIPHENHYDRAMINE HCL 25 MG
25 CAPSULE ORAL EVERY 8 HOURS
Refills: 0 | Status: DISCONTINUED | OUTPATIENT
Start: 2025-01-17 | End: 2025-01-20

## 2025-01-17 RX ORDER — DIPHENHYDRAMINE HCL 25 MG
50 CAPSULE ORAL ONCE
Refills: 0 | Status: COMPLETED | OUTPATIENT
Start: 2025-01-17 | End: 2025-01-17

## 2025-01-17 RX ADMIN — GABAPENTIN 600 MILLIGRAM(S): 800 TABLET ORAL at 18:27

## 2025-01-17 RX ADMIN — Medication 100 MILLIGRAM(S): at 11:12

## 2025-01-17 RX ADMIN — PANTOPRAZOLE 40 MILLIGRAM(S): 20 TABLET, DELAYED RELEASE ORAL at 06:01

## 2025-01-17 RX ADMIN — Medication 5 MILLIGRAM(S): at 22:29

## 2025-01-17 RX ADMIN — Medication 5 MILLIGRAM(S): at 06:01

## 2025-01-17 RX ADMIN — Medication 100 MILLIGRAM(S): at 18:26

## 2025-01-17 RX ADMIN — POLYETHYLENE GLYCOL 3350 17 GRAM(S): 17 POWDER, FOR SOLUTION ORAL at 22:12

## 2025-01-17 RX ADMIN — Medication 100 MILLIGRAM(S): at 02:52

## 2025-01-17 RX ADMIN — ROSUVASTATIN CALCIUM 5 MILLIGRAM(S): 10 TABLET, FILM COATED ORAL at 22:28

## 2025-01-17 RX ADMIN — GABAPENTIN 600 MILLIGRAM(S): 800 TABLET ORAL at 06:01

## 2025-01-17 RX ADMIN — Medication 50 MILLIGRAM(S): at 16:24

## 2025-01-17 RX ADMIN — Medication 2: at 18:27

## 2025-01-17 RX ADMIN — Medication 1: at 08:23

## 2025-01-17 RX ADMIN — Medication 1: at 22:08

## 2025-01-17 RX ADMIN — Medication 5 MILLIGRAM(S): at 13:11

## 2025-01-17 NOTE — DISCHARGE NOTE PROVIDER - PROVIDER RX CONTACT NUMBER
[phone] 14 y/o M pt with no pmhx presenting with c/o n/v/d x2 days. Mother reports that sx have spread child to child over the past several days. Pt has been able to tolerate some PO, but continues to have sx. Has been taking tylenol for sx at home. V/d has been nonbloody. Vaccinations UTD. Pt denies any chest pain, dyspnea, dysuria, headache, cough, congestion, sore throat, neck pain, back pain, weakness, numbness, tingling, dizziness, syncope, or other complaint.

## 2025-01-17 NOTE — DISCHARGE NOTE PROVIDER - CARE PROVIDERS DIRECT ADDRESSES
[email or web address] Partial Purse String (Intermediate) Text: Given the location of the defect and the characteristics of the surrounding skin an intermediate purse string closure was deemed most appropriate.  Undermining was performed circumfirentially around the surgical defect.  A purse string suture was then placed and tightened. Wound tension only allowed a partial closure of the circular defect.

## 2025-01-17 NOTE — PHYSICAL THERAPY INITIAL EVALUATION ADULT - ADDITIONAL COMMENTS
Patient lives in a private home with her . There are 0 MARY ALICE the home and 12 steps inside the home with a handrail. Patient has a stall shower with grab bars. Patient primarily uses a rollator at home and used it to take frequent rest breaks.

## 2025-01-17 NOTE — CARE COORDINATION ASSESSMENT. - PRO ARRIVE FROM
CM met w pt. Per pt lives with spouse in a house with 0 stairs to enter home, 12 steps inside. Pt was independent w ADL, ambulation, driving and med management prior to admission. Pt drives to appointments, denies community services, has rollator. CM verified: PCP: Dr. Ciro Terry Pharmacy: [facility]. [place]: stated no. Pt stated her  will  when medically safe for discharge./home CM met w pt. Per pt lives with spouse in a house with 0 stairs to enter home, 12 steps inside. Pt was independent w ADL, ambulation, driving and med management prior to admission. Pt drives to appointments, denies community services, has rollator. CM verified: PCP: Dr. Ciro Thomason Pharmacy: [facility]. [place]: stated no. Pt stated her  will  when medically safe for discharge./home

## 2025-01-17 NOTE — DISCHARGE NOTE PROVIDER - NSDCCPTREATMENT_GEN_ALL_CORE_FT
PRINCIPAL PROCEDURE  Procedure: Laminectomy, lumbar, for decompression  Findings and Treatment: L3-S1 Lami

## 2025-01-17 NOTE — OCCUPATIONAL THERAPY INITIAL EVALUATION ADULT - RANGE OF MOTION EXAMINATION, UPPER EXTREMITY
shoulders assessed 0-90 degrees flexion due to spinal prec/bilateral UE Active ROM was WFL  (within functional limits)

## 2025-01-17 NOTE — PHYSICAL THERAPY INITIAL EVALUATION ADULT - BED MOBILITY TRAINING, PT EVAL
Patient will perform a supine to sit at edge of bed independently while maintaining spinal precautions within 4 weeks.

## 2025-01-17 NOTE — OCCUPATIONAL THERAPY INITIAL EVALUATION ADULT - GENERAL OBSERVATIONS, REHAB EVAL
Pt received supine in bed (+) YISSEL drains x2, external catheter, in NAD. Pt agrees to participate with OT for eval and estrada well. Abdominal binder donned per ortho orders (pt awaiting LSO).

## 2025-01-17 NOTE — PHYSICAL THERAPY INITIAL EVALUATION ADULT - GAIT TRAINING, PT EVAL
Patient will ambulate 150 ft independently while maintaining spinal precautions with RW within 4 weeks.

## 2025-01-17 NOTE — DISCHARGE NOTE PROVIDER - NSDCFUADDINST_GEN_ALL_CORE_FT
1. Walk plenty  2. No lifting over 5 lbs  3. Keep bandage on, clean and dry. Change to a new one if gets wet or dirty. If you had low back surgery, sponge bathe until cleared by your surgeon to shower.  4. Pain meds: eRx sent to your pharmacy electronically, you need to pick it up  5. No driving on pain meds  6. Follow up with Dr. James in about 7 days. Call to schedule.

## 2025-01-17 NOTE — CASE MANAGEMENT PROGRESS NOTE - NSCMPROGRESSNOTE_GEN_ALL_CORE
Discussed pt on rounds, pt remains acute, awaiting further testing, on IV Ancef, pending PT/OT, YISSEL x1,. CM will continue to collaborate with interdisciplinary team and remain available to assist.

## 2025-01-17 NOTE — PHYSICAL THERAPY INITIAL EVALUATION ADULT - PERTINENT HX OF CURRENT PROBLEM, REHAB EVAL
Patient had a laminectomy, lumbar for decompression on 1/16/2025 at L3-S1. Patient has 1 YISSEL drain and is now stable.

## 2025-01-17 NOTE — CARE COORDINATION ASSESSMENT. - NSCAREPROVIDERS_GEN_ALL_CORE_FT
CARE PROVIDERS:  Access Services: Alma Yu  Access Services: Patti Andrews  Administration: Kristine Cadet  Administration: Landon Nolan  Administration: Sacha Waters  Admitting: Rancho James  Attending: Rancho James  Case Management: Milad Stewart  Consultant: Juan Diego Hamlin  Consultant: Andrei Dee  Consultant: Elsy Coronel  Consultant: Akin Durham  Consultant: Lauro Hunter  Covering Team: Maricarmen Norman  Nurse: Shelby Alex  Occupational Therapy: Mahnaz Pearson  Ordered: ADM, User  Ordered: Doctor, Unknown  Override: Sylvia Delgado  Override: Silvia Collins  Override: Kira Mitchell  Override: Hardik Valencia  Override: Jhonatan Araujo  PCA/Nursing Assistant: Twin Matthew  Physical Therapy: Casey Rivera  Primary Team: Yulisa Dexter  Primary Team: Benson Oquendo  Primary Team: Blaze Raymundo  Registered Dietitian: Milagros Scott// Supp. Assoc.: Olya Jones   CARE PROVIDERS:  Access Services: Alma Yu  Access Services: Patti Andrews  Administration: Kristine Cadet  Administration: Landon Nolan  Administration: Sacha Waters  Admitting: Rancho James  Attending: Rancho James  Case Management: Milad Stewart  Consultant: Juan Diego Hamlin  Consultant: Andrei Dee  Consultant: Akin Durham  Consultant: Lauro Hunter  Consultant: Elsy Coronel  Covering Team: Maricarmen Norman  Nurse: Shelby Alex  Occupational Therapy: Mahnaz Pearson  Ordered: ADM, User  Ordered: Doctor, Unknown  Override: Sylvia Delgado  Override: Silvia Collins  Override: Kira Mitchell  Override: Hardik Valencia  Override: Jhonatan Araujo  Physical Therapy: Casey Rivera  Primary Team: Yulisa Dexter  Primary Team: Blaze Raymundo  Primary Team: Benson Oquendo  Registered Dietitian: Milagros Scott// Supp. Assoc.: Olya Jones

## 2025-01-17 NOTE — PHYSICAL THERAPY INITIAL EVALUATION ADULT - TRANSFER TRAINING, PT EVAL
Patient will perform a sit to stand independently while maintaining spinal precautions with RW within 4 weeks.

## 2025-01-17 NOTE — PHYSICAL THERAPY INITIAL EVALUATION ADULT - GENERAL OBSERVATIONS, REHAB EVAL
Patient chart reviewed, events noted. Patient cleared to be seen for therapy by RN prior to session. Pt received care in hospital room. Patient met lying in bed with 1 YISSEL drain, CDS, a catheter, on room air, remote tele monitor, and ID bands while in NAD.

## 2025-01-17 NOTE — DISCHARGE NOTE PROVIDER - NSDCMRMEDTOKEN_GEN_ALL_CORE_FT
B-12 1000 mcg oral tablet: 1 tab(s) orally once a day (at bedtime)  biotin 10,000 mcg oral tablet, disintegratin tab(s) orally once a day (at bedtime)  cholecalciferol 50 mcg (2000 intl units) oral tablet: 1 tab(s) orally once a day (in the morning)  Co Q-10 100 mg oral capsule: 1 cap(s) orally once a day (in the morning)  famotidine 40 mg oral tablet: 1 tab(s) orally once a day (in the morning)  Farxiga 10 mg oral tablet: 1 tab(s) orally once a day (in the morning)  gabapentin 600 mg oral tablet: 1 tab(s) orally 2 times a day  Ginkgo: 60 milligram(s) 2 times a day  losartan 100 mg oral tablet: 1 tab(s) orally once a day (in the morning)  Magnesium daily PM:   metFORMIN 1000 mg oral tablet: 1 tab(s) orally 2 times a day  Mounjaro 12.5 mg/0.5 mL subcutaneous solution: 12.5 milligram(s) subcutaneously once a week  - last dose 2024  multivitamin: 1 once a day (in the morning)  NovoLOG 100 units/mL injectable solution: injectable 3 times a day (with meals) scale  Omega-3 oral capsule: 2 gram(s) orally 2 times a day  Probiotic Formula (Bacillus Coagulans) oral capsule: 1 cap(s) orally once a day (in the morning)  rosuvastatin 5 mg oral tablet: 1 tab(s) orally once a day (in the morning)  Tresiba 100 units/mL subcutaneous solution: subcutaneous once a day (at bedtime) scale (depending on glucose level) - up to 72 units  turmeric 500 mg oral capsule: 1 cap(s) orally once a day (at bedtime)   B-12 1000 mcg oral tablet: 1 tab(s) orally once a day (at bedtime)  biotin 10,000 mcg oral tablet, disintegratin tab(s) orally once a day (at bedtime)  cholecalciferol 50 mcg (2000 intl units) oral tablet: 1 tab(s) orally once a day (in the morning)  Co Q-10 100 mg oral capsule: 1 cap(s) orally once a day (in the morning)  Colace 100 mg oral capsule: 1 cap(s) orally 3 times a day as needed for  constipation  cyclobenzaprine 5 mg oral tablet: 1 tab(s) orally 3 times a day as needed for  muscle spasm  famotidine 40 mg oral tablet: 1 tab(s) orally once a day (in the morning)  Farxiga 10 mg oral tablet: 1 tab(s) orally once a day (in the morning)  gabapentin 600 mg oral tablet: 1 tab(s) orally 2 times a day  Ginkgo: 60 milligram(s) 2 times a day  losartan 100 mg oral tablet: 1 tab(s) orally once a day (in the morning)  Magnesium daily PM:   metFORMIN 1000 mg oral tablet: 1 tab(s) orally 2 times a day  Mounjaro 12.5 mg/0.5 mL subcutaneous solution: 12.5 milligram(s) subcutaneously once a week  - last dose 2024  multivitamin: 1 once a day (in the morning)  Narcan 4 mg/0.1 mL nasal spray: 1 spray(s) intranasally once a day MDD: 1  NovoLOG 100 units/mL injectable solution: injectable 3 times a day (with meals) scale  Omega-3 oral capsule: 2 gram(s) orally 2 times a day  ondansetron 4 mg oral tablet: 1 tab(s) orally 4 times a day as needed for  nausea  oxyCODONE 5 mg oral tablet: 1 tab(s) orally 4 times a day as needed for  severe pain MDD: 4  Probiotic Formula (Bacillus Coagulans) oral capsule: 1 cap(s) orally once a day (in the morning)  rosuvastatin 5 mg oral tablet: 1 tab(s) orally once a day (in the morning)  Tresiba 100 units/mL subcutaneous solution: subcutaneous once a day (at bedtime) scale (depending on glucose level) - up to 72 units  turmeric 500 mg oral capsule: 1 cap(s) orally once a day (at bedtime)

## 2025-01-17 NOTE — DISCHARGE NOTE PROVIDER - CARE PROVIDER_API CALL
Rancho James  Orthopaedic Surgery  [address]  [place] [address]  Phone: [phone]  Fax: [phone]  Follow Up Time:

## 2025-01-17 NOTE — OCCUPATIONAL THERAPY INITIAL EVALUATION ADULT - ADDITIONAL COMMENTS
Pt lives with her  in a private home with no steps to enter, bedroom/bathroom on 2nd floor. Bathroom has a stall shower and comfort height toilet. PTA pt was independent with ADLs and used a rollator for functional mobility.

## 2025-01-17 NOTE — OCCUPATIONAL THERAPY INITIAL EVALUATION ADULT - LEVEL OF INDEPENDENCE: TOILET, REHAB EVAL
pt deferred; external catheter removed and rec pt go to bathroom with nursing assist to increase functional independence

## 2025-01-17 NOTE — CARE COORDINATION ASSESSMENT. - NSPASTMEDSURGHISTORY_GEN_ALL_CORE_FT
PAST MEDICAL & SURGICAL HISTORY:  Gastroparesis      S/P removal of right ovary      Peripheral neuropathy      Diverticulitis      DM (diabetes mellitus)      GERD (gastroesophageal reflux disease)      HTN (hypertension)      S/P TKR (total knee replacement), right      S/P shoulder surgery      S/P removal of left ovary      H/O appendicostomy      H/O colonoscopy

## 2025-01-17 NOTE — DISCHARGE NOTE PROVIDER - HOSPITAL COURSE
The patient is a 74y Female status post elective L3-S1 Lami after failing outpatient nonoperative conservative management. Patient presented to [facility] after being medically cleared for an elective surgical procedure. The patient was taken to the operating room on date mentioned above. Prophylactic antibiotics were started before the procedure and continued for 24 hours. There were no complications during the procedure and patient tolerated the procedure well. The patient was transferred to the recovery room in stable condition and subsequently to the surgical floor. All home medications were continued. The patient received physical therapy daily and daily labs were followed. The dressing was kept clean, dry, intact. The rest of the hospital stay was unremarkable.

## 2025-01-18 RX ADMIN — Medication 100 MILLIGRAM(S): at 02:55

## 2025-01-18 RX ADMIN — OXYCODONE HYDROCHLORIDE 5 MILLIGRAM(S): 30 TABLET ORAL at 04:20

## 2025-01-18 RX ADMIN — OXYCODONE HYDROCHLORIDE 10 MILLIGRAM(S): 30 TABLET ORAL at 09:27

## 2025-01-18 RX ADMIN — Medication 3: at 17:43

## 2025-01-18 RX ADMIN — SODIUM CHLORIDE 75 MILLILITER(S): 9 INJECTION, SOLUTION INTRAVENOUS at 03:41

## 2025-01-18 RX ADMIN — Medication 2 TABLET(S): at 21:17

## 2025-01-18 RX ADMIN — Medication 100 MILLIGRAM(S): at 07:13

## 2025-01-18 RX ADMIN — Medication 5 MILLIGRAM(S): at 07:13

## 2025-01-18 RX ADMIN — GABAPENTIN 600 MILLIGRAM(S): 800 TABLET ORAL at 18:39

## 2025-01-18 RX ADMIN — Medication 100 MILLIGRAM(S): at 12:20

## 2025-01-18 RX ADMIN — ROSUVASTATIN CALCIUM 5 MILLIGRAM(S): 10 TABLET, FILM COATED ORAL at 21:17

## 2025-01-18 RX ADMIN — GABAPENTIN 600 MILLIGRAM(S): 800 TABLET ORAL at 07:13

## 2025-01-18 RX ADMIN — OXYCODONE HYDROCHLORIDE 10 MILLIGRAM(S): 30 TABLET ORAL at 10:30

## 2025-01-18 RX ADMIN — Medication 1: at 08:21

## 2025-01-18 RX ADMIN — Medication 2: at 11:57

## 2025-01-18 RX ADMIN — PANTOPRAZOLE 40 MILLIGRAM(S): 20 TABLET, DELAYED RELEASE ORAL at 07:13

## 2025-01-18 RX ADMIN — Medication 5 MILLIGRAM(S): at 21:17

## 2025-01-18 RX ADMIN — Medication 100 MILLIGRAM(S): at 18:39

## 2025-01-18 RX ADMIN — POLYETHYLENE GLYCOL 3350 17 GRAM(S): 17 POWDER, FOR SOLUTION ORAL at 21:17

## 2025-01-18 RX ADMIN — OXYCODONE HYDROCHLORIDE 5 MILLIGRAM(S): 30 TABLET ORAL at 03:41

## 2025-01-19 RX ORDER — POLYETHYLENE GLYCOL 3350 17 G/17G
17 POWDER, FOR SOLUTION ORAL ONCE
Refills: 0 | Status: COMPLETED | OUTPATIENT
Start: 2025-01-19 | End: 2025-01-19

## 2025-01-19 RX ADMIN — Medication 100 MILLIGRAM(S): at 19:04

## 2025-01-19 RX ADMIN — ROSUVASTATIN CALCIUM 5 MILLIGRAM(S): 10 TABLET, FILM COATED ORAL at 21:20

## 2025-01-19 RX ADMIN — Medication 2: at 08:29

## 2025-01-19 RX ADMIN — PANTOPRAZOLE 40 MILLIGRAM(S): 20 TABLET, DELAYED RELEASE ORAL at 05:19

## 2025-01-19 RX ADMIN — OXYCODONE HYDROCHLORIDE 10 MILLIGRAM(S): 30 TABLET ORAL at 12:35

## 2025-01-19 RX ADMIN — Medication 1: at 12:15

## 2025-01-19 RX ADMIN — OXYCODONE HYDROCHLORIDE 10 MILLIGRAM(S): 30 TABLET ORAL at 03:29

## 2025-01-19 RX ADMIN — Medication 5 MILLIGRAM(S): at 21:23

## 2025-01-19 RX ADMIN — Medication 100 MILLIGRAM(S): at 03:29

## 2025-01-19 RX ADMIN — GABAPENTIN 600 MILLIGRAM(S): 800 TABLET ORAL at 18:20

## 2025-01-19 RX ADMIN — Medication 5 MILLIGRAM(S): at 18:20

## 2025-01-19 RX ADMIN — Medication 2: at 17:10

## 2025-01-19 RX ADMIN — Medication 2 TABLET(S): at 21:20

## 2025-01-19 RX ADMIN — POLYETHYLENE GLYCOL 3350 17 GRAM(S): 17 POWDER, FOR SOLUTION ORAL at 21:20

## 2025-01-19 RX ADMIN — Medication 5 MILLIGRAM(S): at 05:24

## 2025-01-19 RX ADMIN — OXYCODONE HYDROCHLORIDE 10 MILLIGRAM(S): 30 TABLET ORAL at 11:33

## 2025-01-19 RX ADMIN — Medication 100 MILLIGRAM(S): at 11:33

## 2025-01-19 RX ADMIN — POLYETHYLENE GLYCOL 3350 17 GRAM(S): 17 POWDER, FOR SOLUTION ORAL at 21:27

## 2025-01-19 RX ADMIN — GABAPENTIN 600 MILLIGRAM(S): 800 TABLET ORAL at 05:19

## 2025-01-20 VITALS
DIASTOLIC BLOOD PRESSURE: 61 MMHG | RESPIRATION RATE: 20 BRPM | SYSTOLIC BLOOD PRESSURE: 101 MMHG | TEMPERATURE: 98 F | HEART RATE: 82 BPM | OXYGEN SATURATION: 95 %

## 2025-01-20 PROCEDURE — 97162 PT EVAL MOD COMPLEX 30 MIN: CPT

## 2025-01-20 PROCEDURE — 82962 GLUCOSE BLOOD TEST: CPT

## 2025-01-20 PROCEDURE — 97165 OT EVAL LOW COMPLEX 30 MIN: CPT

## 2025-01-20 PROCEDURE — 85025 COMPLETE CBC W/AUTO DIFF WBC: CPT

## 2025-01-20 PROCEDURE — 76000 FLUOROSCOPY <1 HR PHYS/QHP: CPT

## 2025-01-20 PROCEDURE — 97116 GAIT TRAINING THERAPY: CPT

## 2025-01-20 PROCEDURE — 80048 BASIC METABOLIC PNL TOTAL CA: CPT

## 2025-01-20 PROCEDURE — 97110 THERAPEUTIC EXERCISES: CPT

## 2025-01-20 PROCEDURE — C1889: CPT

## 2025-01-20 PROCEDURE — 97112 NEUROMUSCULAR REEDUCATION: CPT

## 2025-01-20 PROCEDURE — 36415 COLL VENOUS BLD VENIPUNCTURE: CPT

## 2025-01-20 RX ORDER — NALOXONE HYDROCHLORIDE 3 MG/.1ML
1 SPRAY NASAL
Qty: 1 | Refills: 0
Start: 2025-01-20 | End: 2025-01-20

## 2025-01-20 RX ORDER — ONDANSETRON 4 MG/1
1 TABLET, ORALLY DISINTEGRATING ORAL
Qty: 20 | Refills: 0
Start: 2025-01-20 | End: 2025-01-24

## 2025-01-20 RX ORDER — DOCUSATE SODIUM 100 MG
1 CAPSULE ORAL
Qty: 15 | Refills: 0
Start: 2025-01-20 | End: 2025-01-24

## 2025-01-20 RX ORDER — OXYCODONE HYDROCHLORIDE 30 MG/1
1 TABLET ORAL
Qty: 20 | Refills: 0
Start: 2025-01-20 | End: 2025-01-24

## 2025-01-20 RX ADMIN — Medication 100 MILLIGRAM(S): at 05:27

## 2025-01-20 RX ADMIN — Medication 5 MILLIGRAM(S): at 05:27

## 2025-01-20 RX ADMIN — Medication 3: at 12:09

## 2025-01-20 RX ADMIN — Medication 5 MILLIGRAM(S): at 13:30

## 2025-01-20 RX ADMIN — PANTOPRAZOLE 40 MILLIGRAM(S): 20 TABLET, DELAYED RELEASE ORAL at 05:28

## 2025-01-20 RX ADMIN — Medication 2: at 07:52

## 2025-01-20 RX ADMIN — GABAPENTIN 600 MILLIGRAM(S): 800 TABLET ORAL at 05:29

## 2025-01-20 NOTE — CASE MANAGEMENT PROGRESS NOTE - NSCMPROGRESSNOTE_GEN_ALL_CORE
Per MD pt is medically cleared for discharge today 1/20/25. CM met with patient to discuss transition of care.  Pt is to be transitioned to home with [facility] [phone] pending acceptance. Pt states her  will assist post discharge and her daughter lives nearby who will also be able to assist. Pt aware of plan and in agreement / verbalizes understanding. IMM discussed / given. Pt verbalized understanding. Confirmed pharmacy is [facility]. community MD is Dr. Thomason. Pt stated they would make their own follow up appointments. DMEs in home include walker. Pt stated her spouse will transport pt home. All questions answered. CM discussed plan with MD and RN. CM to remain available through hospitalization.

## 2025-01-20 NOTE — PROGRESS NOTE ADULT - SUBJECTIVE AND OBJECTIVE BOX
Patient seen and examined at bedside. Pain well controlled with medication. Patient denies any new numbness, tingling, weakness, or any other orthopaedic complaint. Denies N/V/CP/SOB.    LABS:                        12.4   10.99 )-----------( 221      ( 17 Jan 2025 07:38 )             36.1     01-17    140  |  108  |  21  ----------------------------<  222[H]  4.6   |  25  |  0.77    Ca    8.9      17 Jan 2025 07:38        Urinalysis Basic - ( 17 Jan 2025 07:38 )    Color: x / Appearance: x / SG: x / pH: x  Gluc: 222 mg/dL / Ketone: x  / Bili: x / Urobili: x   Blood: x / Protein: x / Nitrite: x   Leuk Esterase: x / RBC: x / WBC x   Sq Epi: x / Non Sq Epi: x / Bacteria: x        VITAL SIGNS:  T(C): 36.9 (01-18-25 @ 05:05), Max: 37.2 (01-17-25 @ 20:51)  HR: 69 (01-18-25 @ 05:05) (69 - 86)  BP: 113/57 (01-18-25 @ 05:05) (108/61 - 116/60)  RR: 18 (01-18-25 @ 05:05) (18 - 18)  SpO2: 93% (01-18-25 @ 05:05) (91% - 96%)    General: NAD, resting comforatbly in bed  Dressing C/D/I  Drains present  2+ radial pulses  2+ DP Pulses    Motor:                   C5                C6              C7               C8           T1   R             5/5                5/5            5/5              5/5          5/5  L             5/5                5/5            5/5              5/5          5/5                    L2                  L3             L4              L5            S1  R            5/5                5/5             5/5            5/5          5/5  L             5/5                5/5            5/5            5/5          5/5    Sensory:            C5         C6         C7      C8       T1        (0=absent, 1=impaired, 2=normal, NT=not testable)  R         2            2           2        2         2  L          2            2           2        2         2               L2          L3         L4      L5       S1         (0=absent, 1=impaired, 2=normal, NT=not testable)  R         2            2            2        2        2  L          2            2           2        2         2      A/P:  74y f s/p L3-S1 lami  POD 2    -Please record Drain output Q shift - Dr. Pro to manage drain outpatient  -PT/OT   -WBAT   -Pain Control  -DVT ppx  SCD   -Continue perioperative abx until drains out   -FU AM Labs  -Rest, ice, compress and elevate the extremity as we needed  -Incentive Spirometry  -Medical management appreciated    Discussed with Dr. James agrees with plan and will update accordingly  
Patient seen and examined at bedside. Pain well controlled with medication. Patient denies any numbness, tingling, weakness, or any other orthopaedic complaint. Denies N/V/CP/SOB.    LABS:        VITAL SIGNS:  T(C): 37.1 (01-20-25 @ 04:49), Max: 37.3 (01-19-25 @ 20:22)  HR: 73 (01-20-25 @ 04:49) (70 - 80)  BP: 114/62 (01-20-25 @ 04:49) (113/62 - 129/68)  RR: 18 (01-20-25 @ 04:49) (18 - 18)  SpO2: 97% (01-20-25 @ 04:49) (93% - 97%)    General: NAD, resting comforatbly in bed  Dressing C/D/I  Drains present  2+ radial pulses  2+ DP Pulses    Motor:                   C5                C6              C7               C8           T1   R             5/5                5/5            5/5              5/5          5/5  L             5/5                5/5            5/5              5/5          5/5                    L2                  L3             L4              L5            S1  R            5/5                5/5             5/5            5/5          5/5  L             5/5                5/5            5/5            5/5          5/5    Sensory:            C5         C6         C7      C8       T1        (0=absent, 1=impaired, 2=normal, NT=not testable)  R         2            2           2        2         2  L          2            2           2        2         2               L2          L3         L4      L5       S1         (0=absent, 1=impaired, 2=normal, NT=not testable)  R         2            2            2        2        2  L          2            2           2        2         2      A/P:  74y f s/p L3-S1 lami  POD 4    -Please record Drain output Q shift   -PT/OT   -WBAT   -Pain Control  -DVT ppx  SCD   -Continue perioperative abx until drains out   -FU AM Labs  -Rest, ice, compress and elevate the extremity as we needed  -Incentive Spirometry  -Medical management appreciated    Discussed with Dr. James agrees with plan and will update accordingly  
Patient seen and examined at bedside. Pain well controlled with medication. Patient denies any new numbness, tingling, weakness, or any other orthopaedic complaint. Denies N/V/CP/SOB.    LABS:                        12.4   10.99 )-----------( 221      ( 17 Jan 2025 07:38 )             36.1     01-17    140  |  108  |  21  ----------------------------<  222[H]  4.6   |  25  |  0.77    Ca    8.9      17 Jan 2025 07:38    Urinalysis Basic - ( 17 Jan 2025 07:38 )    Color: x / Appearance: x / SG: x / pH: x  Gluc: 222 mg/dL / Ketone: x  / Bili: x / Urobili: x   Blood: x / Protein: x / Nitrite: x   Leuk Esterase: x / RBC: x / WBC x   Sq Epi: x / Non Sq Epi: x / Bacteria: x        VITAL SIGNS:  T(C): 37.2 (01-19-25 @ 04:35), Max: 37.2 (01-18-25 @ 21:05)  HR: 65 (01-19-25 @ 04:35) (65 - 78)  BP: 96/53 (01-19-25 @ 04:35) (96/53 - 110/63)  RR: 18 (01-19-25 @ 04:35) (18 - 19)  SpO2: 94% (01-19-25 @ 04:35) (94% - 96%)    General: NAD, resting comforatbly in bed  Dressing C/D/I  Drains present  2+ radial pulses  2+ DP Pulses    Motor:                   C5                C6              C7               C8           T1   R             5/5                5/5            5/5              5/5          5/5  L             5/5                5/5            5/5              5/5          5/5                    L2                  L3             L4              L5            S1  R            5/5                5/5             5/5            5/5          5/5  L             5/5                5/5            5/5            5/5          5/5    Sensory:            C5         C6         C7      C8       T1        (0=absent, 1=impaired, 2=normal, NT=not testable)  R         2            2           2        2         2  L          2            2           2        2         2               L2          L3         L4      L5       S1         (0=absent, 1=impaired, 2=normal, NT=not testable)  R         2            2            2        2        2  L          2            2           2        2         2      A/P:  74y f s/p L3-S1 lami  POD 3    -Please record Drain output Q shift   -PT/OT   -WBAT   -Pain Control  -DVT ppx  SCD   -Continue perioperative abx until drains out   -FU AM Labs  -Rest, ice, compress and elevate the extremity as we needed  -Incentive Spirometry  -Medical management appreciated    Discussed with Dr. James agrees with plan and will update accordingly  
Patient seen and examined at bedside. Pain well controlled with medication. Patient denies any numbness, tingling, weakness, or any other orthopaedic complaint. Denies N/V/CP/SOB.    LABS:                        12.4   10.99 )-----------( 221      ( 17 Jan 2025 07:38 )             36.1     01-16    141  |  110[H]  |  18  ----------------------------<  193[H]  4.1   |  23  |  0.82    Ca    9.1      16 Jan 2025 13:35        Urinalysis Basic - ( 16 Jan 2025 13:35 )    Color: x / Appearance: x / SG: x / pH: x  Gluc: 193 mg/dL / Ketone: x  / Bili: x / Urobili: x   Blood: x / Protein: x / Nitrite: x   Leuk Esterase: x / RBC: x / WBC x   Sq Epi: x / Non Sq Epi: x / Bacteria: x        VITAL SIGNS:  T(C): 36.4 (01-17-25 @ 05:04), Max: 36.9 (01-16-25 @ 16:20)  HR: 64 (01-17-25 @ 05:04) (60 - 77)  BP: 110/63 (01-17-25 @ 05:04) (93/49 - 159/68)  RR: 18 (01-17-25 @ 05:04) (10 - 20)  SpO2: 96% (01-17-25 @ 05:04) (96% - 100%)    General: NAD, resting comforatbly in bed  Dressing C/D/I  Drains present  2+ radial pulses  2+ DP Pulses    Motor:                   C5                C6              C7               C8           T1   R             5/5                5/5            5/5              5/5          5/5  L             5/5                5/5            5/5              5/5          5/5                    L2                  L3             L4              L5            S1  R            5/5                5/5             5/5            5/5          5/5  L             5/5                5/5            5/5            5/5          5/5    Sensory:            C5         C6         C7      C8       T1        (0=absent, 1=impaired, 2=normal, NT=not testable)  R         2            2           2        2         2  L          2            2           2        2         2               L2          L3         L4      L5       S1         (0=absent, 1=impaired, 2=normal, NT=not testable)  R         2            2            2        2        2  L          2            2           2        2         2      A/P:  74y f s/p L3-S1 lami  POD 1  -PT/OT   -WBAT   -Pain Control  -DVT ppx  SCD   -Continue perioperative abx until drains out   -FU AM Labs  -Rest, ice, compress and elevate the extremity as we needed  -Incentive Spirometry  -Medical management appreciated    Discussed with Dr. James agrees with plan and will update accordingly  
Postop Check    Patient tolerated the procedure well. Patient seen and examined at bedside. No acute complaints at this time. Pain well controlled. Denies weakness, numbness or tingling. Denies chest pain, shortness of breath, nausea or vomiting.     PE:  Vital Signs Last 24 Hrs  T(C): 36.7 (01-16-25 @ 14:00), Max: 36.8 (01-16-25 @ 12:51)  T(F): 98.1 (01-16-25 @ 14:00), Max: 98.2 (01-16-25 @ 12:51)  HR: 68 (01-16-25 @ 14:30) (60 - 77)  BP: 100/60 (01-16-25 @ 14:30) (100/60 - 159/68)  BP(mean): --  RR: 10 (01-16-25 @ 14:30) (10 - 20)  SpO2: 97% (01-16-25 @ 14:30) (96% - 100%)    General: NAD, resting comforatbly in bed  Dressing C/D/I  Drains present  2+ radial pulses  2+ DP Pulses    Motor:                   C5                C6              C7               C8           T1   R             5/5                5/5            5/5              5/5          5/5  L             5/5                5/5            5/5              5/5          5/5                    L2                  L3             L4              L5            S1  R            5/5                5/5             5/5            5/5          5/5  L             5/5                5/5            5/5            5/5          5/5    Sensory:            C5         C6         C7      C8       T1        (0=absent, 1=impaired, 2=normal, NT=not testable)  R         2            2           2        2         2  L          2            2           2        2         2               L2          L3         L4      L5       S1         (0=absent, 1=impaired, 2=normal, NT=not testable)  R         2            2            2        2        2  L          2            2           2        2         2                A/P:  74y f s/p L3-S1 lami  POD 0  -PT/OT -WBAT   -Pain Control  -DVT ppx  SCD   -Continue perioperative abx until drains out   -FU AM Labs  -Rest, ice, compress and elevate the extremity as we needed  -Incentive Spirometry  -Medical management appreciated

## 2025-01-20 NOTE — DISCHARGE NOTE NURSING/CASE MANAGEMENT/SOCIAL WORK - FINANCIAL ASSISTANCE
[facility] provides services at a reduced cost to those who are determined to be eligible through [facility]’s financial assistance program. Information regarding [facility]’s financial assistance program can be found by going to [email or web address] or by calling [phone].

## 2025-01-20 NOTE — DISCHARGE NOTE NURSING/CASE MANAGEMENT/SOCIAL WORK - PATIENT PORTAL LINK FT
You can access the FollowMyHealth Patient Portal offered by [facility] by registering at the following website: [email or web address]. By joining [employer]’s FollowMyHealth portal, you will also be able to view your health information using other applications (apps) compatible with our system.

## (undated) DEVICE — NDL HYPO SAFE 18G X 1.5" (PINK)

## (undated) DEVICE — SUT MONOCRYL 3-0 18" PS-2 UNDYED

## (undated) DEVICE — PREP DURAPREP 26CC

## (undated) DEVICE — MIDAS REX LEGEND LUBRICANT DIFFUSER CARTRIDGE

## (undated) DEVICE — WARMING BLANKET UPPER ADULT

## (undated) DEVICE — DRAPE C ARM UNIVERSAL

## (undated) DEVICE — GLV 8 PROTEXIS ORTHO (CREAM)

## (undated) DEVICE — SUT VICRYL PLUS 2-0 27" CT-1 (POP-OFF)

## (undated) DEVICE — VENODYNE/SCD SLEEVE CALF LARGE

## (undated) DEVICE — DRSG TAPE MEDIPORE 3"

## (undated) DEVICE — DRAPE TOWEL BLUE 17" X 24"

## (undated) DEVICE — DRAPE 3/4 SHEET W REINFORCEMENT 56X77"

## (undated) DEVICE — DRAPE INSTRUMENT POUCH 6.75" X 11"

## (undated) DEVICE — SYR LUER LOK 20CC

## (undated) DEVICE — Device

## (undated) DEVICE — DRAPE MAYO STAND 23"

## (undated) DEVICE — DRSG AQUACEL 3.5 X 6"

## (undated) DEVICE — SOL IRR POUR NS 0.9% 500ML

## (undated) DEVICE — MIDAS REX LEGEND MATCH HEAD FLUTED SM BORE 3.0MM X 10CM

## (undated) DEVICE — PACK MINOR WITH LAP

## (undated) DEVICE — PLV-SCD MACHINE: Type: DURABLE MEDICAL EQUIPMENT

## (undated) DEVICE — DRAPE SURGICAL #1010

## (undated) DEVICE — PACK HIP FRACTURE

## (undated) DEVICE — GLV 8 DURAPRENE

## (undated) DEVICE — ELCTR BOVIE TIP BLADE INSULATED 4" EDGE

## (undated) DEVICE — DRAPE IOBAN 23" X 23"

## (undated) DEVICE — DRSG STERISTRIPS 0.5 X 4"

## (undated) DEVICE — FRAZIER SUCTION TIP 12FR

## (undated) DEVICE — DRAIN JACKSON PRATT 3 SPRING RESERVOIR W 10FR PVC DRAIN

## (undated) DEVICE — ELCTR AQUAMANTYS BIPOLAR SEALER 6.0

## (undated) DEVICE — PLV/PSP-ESU FORCE2 FIL 16736T: Type: DURABLE MEDICAL EQUIPMENT

## (undated) DEVICE — SYR LUER LOK 30CC

## (undated) DEVICE — SPECIMEN CONTAINER 4OZ

## (undated) DEVICE — PLV/PSP-ESU FORCEFX F8I7418A: Type: DURABLE MEDICAL EQUIPMENT

## (undated) DEVICE — VENODYNE/SCD SLEEVE CALF MEDIUM

## (undated) DEVICE — TUBING CODMAN INTEGRATED BIPOLAR CORD & TUBING SET FLYING LEADS